# Patient Record
Sex: FEMALE | Race: WHITE | NOT HISPANIC OR LATINO
[De-identification: names, ages, dates, MRNs, and addresses within clinical notes are randomized per-mention and may not be internally consistent; named-entity substitution may affect disease eponyms.]

---

## 2017-01-03 ENCOUNTER — OTHER (OUTPATIENT)
Age: 67
End: 2017-01-03

## 2017-01-03 DIAGNOSIS — R92.2 INCONCLUSIVE MAMMOGRAM: ICD-10-CM

## 2017-03-13 ENCOUNTER — RX RENEWAL (OUTPATIENT)
Age: 67
End: 2017-03-13

## 2017-07-07 ENCOUNTER — RX RENEWAL (OUTPATIENT)
Age: 67
End: 2017-07-07

## 2017-11-22 ENCOUNTER — RX RENEWAL (OUTPATIENT)
Age: 67
End: 2017-11-22

## 2017-11-28 ENCOUNTER — APPOINTMENT (OUTPATIENT)
Dept: FAMILY MEDICINE | Facility: CLINIC | Age: 67
End: 2017-11-28
Payer: COMMERCIAL

## 2017-11-28 ENCOUNTER — NON-APPOINTMENT (OUTPATIENT)
Age: 67
End: 2017-11-28

## 2017-11-28 VITALS
RESPIRATION RATE: 14 BRPM | WEIGHT: 157 LBS | SYSTOLIC BLOOD PRESSURE: 126 MMHG | TEMPERATURE: 98 F | HEART RATE: 76 BPM | HEIGHT: 62 IN | BODY MASS INDEX: 28.89 KG/M2 | DIASTOLIC BLOOD PRESSURE: 70 MMHG | OXYGEN SATURATION: 99 %

## 2017-11-28 PROCEDURE — G0402 INITIAL PREVENTIVE EXAM: CPT

## 2017-11-28 PROCEDURE — G0438: CPT

## 2017-11-28 PROCEDURE — 93000 ELECTROCARDIOGRAM COMPLETE: CPT

## 2017-11-30 ENCOUNTER — APPOINTMENT (OUTPATIENT)
Dept: FAMILY MEDICINE | Facility: CLINIC | Age: 67
End: 2017-11-30
Payer: MEDICARE

## 2017-11-30 VITALS — SYSTOLIC BLOOD PRESSURE: 120 MMHG | TEMPERATURE: 97.6 F | DIASTOLIC BLOOD PRESSURE: 70 MMHG

## 2017-11-30 PROCEDURE — 99213 OFFICE O/P EST LOW 20 MIN: CPT

## 2017-12-05 ENCOUNTER — APPOINTMENT (OUTPATIENT)
Dept: FAMILY MEDICINE | Facility: CLINIC | Age: 67
End: 2017-12-05
Payer: MEDICARE

## 2017-12-05 VITALS — DIASTOLIC BLOOD PRESSURE: 78 MMHG | SYSTOLIC BLOOD PRESSURE: 120 MMHG | TEMPERATURE: 98.6 F

## 2017-12-05 DIAGNOSIS — L03.113 CELLULITIS OF RIGHT UPPER LIMB: ICD-10-CM

## 2017-12-05 PROCEDURE — 99213 OFFICE O/P EST LOW 20 MIN: CPT

## 2018-01-18 ENCOUNTER — APPOINTMENT (OUTPATIENT)
Dept: FAMILY MEDICINE | Facility: CLINIC | Age: 68
End: 2018-01-18
Payer: MEDICARE

## 2018-01-18 VITALS — TEMPERATURE: 97.6 F | SYSTOLIC BLOOD PRESSURE: 112 MMHG | DIASTOLIC BLOOD PRESSURE: 70 MMHG

## 2018-01-18 DIAGNOSIS — Z02.9 ENCOUNTER FOR ADMINISTRATIVE EXAMINATIONS, UNSPECIFIED: ICD-10-CM

## 2018-01-18 PROCEDURE — 99080 SPECIAL REPORTS OR FORMS: CPT

## 2018-01-18 PROCEDURE — 99213 OFFICE O/P EST LOW 20 MIN: CPT

## 2018-01-19 PROBLEM — Z02.9 ADMINISTRATIVE ENCOUNTER: Status: ACTIVE | Noted: 2018-01-19

## 2018-01-29 ENCOUNTER — APPOINTMENT (OUTPATIENT)
Dept: FAMILY MEDICINE | Facility: CLINIC | Age: 68
End: 2018-01-29
Payer: MEDICARE

## 2018-01-29 VITALS — TEMPERATURE: 97.5 F | DIASTOLIC BLOOD PRESSURE: 72 MMHG | SYSTOLIC BLOOD PRESSURE: 118 MMHG

## 2018-01-29 PROCEDURE — 90707 MMR VACCINE SC: CPT | Mod: GY

## 2018-01-29 PROCEDURE — 90471 IMMUNIZATION ADMIN: CPT | Mod: GY

## 2018-01-31 ENCOUNTER — APPOINTMENT (OUTPATIENT)
Dept: FAMILY MEDICINE | Facility: CLINIC | Age: 68
End: 2018-01-31
Payer: MEDICARE

## 2018-01-31 VITALS — SYSTOLIC BLOOD PRESSURE: 110 MMHG | DIASTOLIC BLOOD PRESSURE: 68 MMHG | TEMPERATURE: 97.8 F

## 2018-01-31 DIAGNOSIS — Z23 ENCOUNTER FOR IMMUNIZATION: ICD-10-CM

## 2018-01-31 PROCEDURE — 90471 IMMUNIZATION ADMIN: CPT | Mod: GY

## 2018-01-31 PROCEDURE — 90714 TD VACC NO PRESV 7 YRS+ IM: CPT | Mod: GY

## 2018-04-20 ENCOUNTER — APPOINTMENT (OUTPATIENT)
Dept: FAMILY MEDICINE | Facility: CLINIC | Age: 68
End: 2018-04-20
Payer: MEDICARE

## 2018-04-20 VITALS — DIASTOLIC BLOOD PRESSURE: 70 MMHG | TEMPERATURE: 98 F | SYSTOLIC BLOOD PRESSURE: 120 MMHG

## 2018-04-20 DIAGNOSIS — G51.8 OTHER DISORDERS OF FACIAL NERVE: ICD-10-CM

## 2018-04-20 PROCEDURE — 99214 OFFICE O/P EST MOD 30 MIN: CPT

## 2018-04-22 PROBLEM — G51.8 FACIAL NEURALGIA: Status: ACTIVE | Noted: 2018-04-22

## 2018-05-12 ENCOUNTER — TRANSCRIPTION ENCOUNTER (OUTPATIENT)
Age: 68
End: 2018-05-12

## 2018-06-07 ENCOUNTER — NON-APPOINTMENT (OUTPATIENT)
Age: 68
End: 2018-06-07

## 2018-06-07 ENCOUNTER — APPOINTMENT (OUTPATIENT)
Dept: FAMILY MEDICINE | Facility: CLINIC | Age: 68
End: 2018-06-07
Payer: MEDICARE

## 2018-06-07 VITALS
HEIGHT: 62 IN | TEMPERATURE: 98.1 F | BODY MASS INDEX: 28.52 KG/M2 | SYSTOLIC BLOOD PRESSURE: 158 MMHG | DIASTOLIC BLOOD PRESSURE: 90 MMHG | WEIGHT: 155 LBS | RESPIRATION RATE: 14 BRPM | HEART RATE: 73 BPM | OXYGEN SATURATION: 98 %

## 2018-06-07 DIAGNOSIS — Z01.818 ENCOUNTER FOR OTHER PREPROCEDURAL EXAMINATION: ICD-10-CM

## 2018-06-07 PROCEDURE — 93000 ELECTROCARDIOGRAM COMPLETE: CPT

## 2018-06-07 PROCEDURE — 99215 OFFICE O/P EST HI 40 MIN: CPT | Mod: 25

## 2018-06-07 PROCEDURE — 36415 COLL VENOUS BLD VENIPUNCTURE: CPT

## 2018-06-08 LAB
ALBUMIN SERPL ELPH-MCNC: 4.5 G/DL
ALP BLD-CCNC: 64 U/L
ALT SERPL-CCNC: 38 U/L
ANION GAP SERPL CALC-SCNC: 16 MMOL/L
APPEARANCE: CLEAR
APTT BLD: 28.5 SEC
AST SERPL-CCNC: 27 U/L
BACTERIA: NEGATIVE
BASOPHILS # BLD AUTO: 0.02 K/UL
BASOPHILS NFR BLD AUTO: 0.4 %
BILIRUB SERPL-MCNC: <0.2 MG/DL
BILIRUBIN URINE: NEGATIVE
BLOOD URINE: NEGATIVE
BUN SERPL-MCNC: 18 MG/DL
CALCIUM SERPL-MCNC: 9.5 MG/DL
CHLORIDE SERPL-SCNC: 101 MMOL/L
CO2 SERPL-SCNC: 24 MMOL/L
COLOR: YELLOW
CREAT SERPL-MCNC: 0.9 MG/DL
EOSINOPHIL # BLD AUTO: 0.14 K/UL
EOSINOPHIL NFR BLD AUTO: 2.6 %
GLUCOSE QUALITATIVE U: NEGATIVE MG/DL
GLUCOSE SERPL-MCNC: 83 MG/DL
HCT VFR BLD CALC: 40.1 %
HGB BLD-MCNC: 12.7 G/DL
HYALINE CASTS: 0 /LPF
IMM GRANULOCYTES NFR BLD AUTO: 0.4 %
INR PPP: 0.85 RATIO
KETONES URINE: NEGATIVE
LEUKOCYTE ESTERASE URINE: NEGATIVE
LYMPHOCYTES # BLD AUTO: 1.95 K/UL
LYMPHOCYTES NFR BLD AUTO: 36.8 %
MAN DIFF?: NORMAL
MCHC RBC-ENTMCNC: 28.9 PG
MCHC RBC-ENTMCNC: 31.7 GM/DL
MCV RBC AUTO: 91.3 FL
MICROSCOPIC-UA: NORMAL
MONOCYTES # BLD AUTO: 0.44 K/UL
MONOCYTES NFR BLD AUTO: 8.3 %
NEUTROPHILS # BLD AUTO: 2.73 K/UL
NEUTROPHILS NFR BLD AUTO: 51.5 %
NITRITE URINE: NEGATIVE
PH URINE: 5.5
PLATELET # BLD AUTO: 361 K/UL
POTASSIUM SERPL-SCNC: 4.7 MMOL/L
PROT SERPL-MCNC: 7.3 G/DL
PROTEIN URINE: NEGATIVE MG/DL
PT BLD: 9.6 SEC
RBC # BLD: 4.39 M/UL
RBC # FLD: 14.3 %
RED BLOOD CELLS URINE: 2 /HPF
SODIUM SERPL-SCNC: 141 MMOL/L
SPECIFIC GRAVITY URINE: 1.02
SQUAMOUS EPITHELIAL CELLS: 1 /HPF
UROBILINOGEN URINE: NEGATIVE MG/DL
WBC # FLD AUTO: 5.3 K/UL
WHITE BLOOD CELLS URINE: 1 /HPF

## 2018-06-14 ENCOUNTER — APPOINTMENT (OUTPATIENT)
Dept: FAMILY MEDICINE | Facility: CLINIC | Age: 68
End: 2018-06-14
Payer: MEDICARE

## 2018-06-14 VITALS — SYSTOLIC BLOOD PRESSURE: 140 MMHG | TEMPERATURE: 98.4 F | DIASTOLIC BLOOD PRESSURE: 80 MMHG

## 2018-06-14 DIAGNOSIS — J06.9 ACUTE UPPER RESPIRATORY INFECTION, UNSPECIFIED: ICD-10-CM

## 2018-06-14 PROCEDURE — 99214 OFFICE O/P EST MOD 30 MIN: CPT

## 2018-06-14 RX ORDER — DOXYCYCLINE HYCLATE 100 MG/1
100 CAPSULE ORAL
Qty: 10 | Refills: 0 | Status: DISCONTINUED | COMMUNITY
Start: 2017-11-30 | End: 2018-06-14

## 2018-08-13 ENCOUNTER — RX RENEWAL (OUTPATIENT)
Age: 68
End: 2018-08-13

## 2018-10-31 ENCOUNTER — TRANSCRIPTION ENCOUNTER (OUTPATIENT)
Age: 68
End: 2018-10-31

## 2018-11-01 ENCOUNTER — APPOINTMENT (OUTPATIENT)
Dept: FAMILY MEDICINE | Facility: CLINIC | Age: 68
End: 2018-11-01
Payer: MEDICARE

## 2018-11-01 VITALS — DIASTOLIC BLOOD PRESSURE: 80 MMHG | SYSTOLIC BLOOD PRESSURE: 130 MMHG

## 2018-11-01 DIAGNOSIS — T78.40XA ALLERGY, UNSPECIFIED, INITIAL ENCOUNTER: ICD-10-CM

## 2018-11-01 DIAGNOSIS — Z87.09 PERSONAL HISTORY OF OTHER DISEASES OF THE RESPIRATORY SYSTEM: ICD-10-CM

## 2018-11-01 PROCEDURE — 99213 OFFICE O/P EST LOW 20 MIN: CPT

## 2018-11-01 NOTE — HISTORY OF PRESENT ILLNESS
[de-identified] : Pt presents to office for cough and URI.Pt went to UC 2 days ago dx with virus and given Bromfed which she had an allergic reaction with itching all over body however no rash noted. Also c/o bruising on arms.Takes baby asa daily

## 2018-11-01 NOTE — REVIEW OF SYSTEMS
[Fatigue] : fatigue [Nasal Discharge] : nasal discharge [Sore Throat] : sore throat [Postnasal Drip] : postnasal drip [Cough] : cough [Easy Bruising] : easy bruising [Negative] : Cardiovascular

## 2018-11-01 NOTE — PHYSICAL EXAM
[No Acute Distress] : no acute distress [Normal Oropharynx] : the oropharynx was normal [Supple] : supple [Clear to Auscultation] : lungs were clear to auscultation bilaterally [Regular Rhythm] : with a regular rhythm [de-identified] : few minor bruising on wrists

## 2018-11-01 NOTE — PHYSICAL EXAM
[No Acute Distress] : no acute distress [Normal Oropharynx] : the oropharynx was normal [Supple] : supple [Clear to Auscultation] : lungs were clear to auscultation bilaterally [Regular Rhythm] : with a regular rhythm [de-identified] : few minor bruising on wrists

## 2018-11-01 NOTE — HISTORY OF PRESENT ILLNESS
[de-identified] : Pt presents to office for cough and URI.Pt went to UC 2 days ago dx with virus and given Bromfed which she had an allergic reaction with itching all over body however no rash noted. Also c/o bruising on arms.Takes baby asa daily

## 2018-11-28 ENCOUNTER — RX RENEWAL (OUTPATIENT)
Age: 68
End: 2018-11-28

## 2018-11-28 DIAGNOSIS — T75.3XXA MOTION SICKNESS, INITIAL ENCOUNTER: ICD-10-CM

## 2018-11-28 RX ORDER — SCOPALAMINE 1 MG/3D
1 PATCH, EXTENDED RELEASE TRANSDERMAL
Qty: 1 | Refills: 0 | Status: ACTIVE | COMMUNITY
Start: 2018-01-29 | End: 1900-01-01

## 2018-12-26 NOTE — ASSESSMENT
[FreeTextEntry1] : Pt presents to office for cough and URI.Pt went to UC 2 days ago dx with virus and given Bromed which she had an allergic reaction with itching all over body however no rash noted. Also c/o bruising on arms.Takes baby asa daily\par \par pt will decrease Asa to twice weekly\par Pt to start Tessalon perle\par 
[FreeTextEntry1] : Pt presents to office for cough and URI.Pt went to UC 2 days ago dx with virus and given Bromed which she had an allergic reaction with itching all over body however no rash noted. Also c/o bruising on arms.Takes baby asa daily\par \par pt will decrease Asa to twice weekly\par Pt to start Tessalon perle\par 
done

## 2019-02-11 ENCOUNTER — APPOINTMENT (OUTPATIENT)
Dept: FAMILY MEDICINE | Facility: CLINIC | Age: 69
End: 2019-02-11
Payer: MEDICARE

## 2019-02-11 VITALS
TEMPERATURE: 97.7 F | WEIGHT: 166 LBS | SYSTOLIC BLOOD PRESSURE: 132 MMHG | RESPIRATION RATE: 14 BRPM | HEIGHT: 62 IN | HEART RATE: 76 BPM | DIASTOLIC BLOOD PRESSURE: 80 MMHG | BODY MASS INDEX: 30.55 KG/M2 | OXYGEN SATURATION: 98 %

## 2019-02-11 DIAGNOSIS — M19.90 UNSPECIFIED OSTEOARTHRITIS, UNSPECIFIED SITE: ICD-10-CM

## 2019-02-11 PROCEDURE — G0438: CPT

## 2019-02-11 NOTE — HEALTH RISK ASSESSMENT
[Patient reported mammogram was normal] : Patient reported mammogram was normal [Patient reported PAP Smear was normal] : Patient reported PAP Smear was normal [MammogramDate] : 1/19 [PapSmearDate] : 4/18 [BoneDensityDate] : 1/19 [ColonoscopyDate] : 2015

## 2019-02-11 NOTE — ASSESSMENT
[FreeTextEntry1] : Pt presents to office for routine PE. Forms to be filled out for her work. Offers no complaints.Pt gained weight over the last few months. Not compliant with diet.\par Labs Normal except Calcium slightly low 8.4\par willreepat in 3 months\par Pt encouraged to watch diet and increase exercise\par Reviewed titiers for form

## 2019-02-11 NOTE — PHYSICAL EXAM

## 2019-02-11 NOTE — HISTORY OF PRESENT ILLNESS
[de-identified] : Pt presents to office for routine PE. Forms to be filled out for her work. Offers no complaints.Pt gained weight over the last few months. Not compliant with diet.

## 2019-02-13 LAB — HBA1C MFR BLD HPLC: 5.5

## 2019-03-07 ENCOUNTER — RX RENEWAL (OUTPATIENT)
Age: 69
End: 2019-03-07

## 2019-03-26 ENCOUNTER — APPOINTMENT (OUTPATIENT)
Dept: FAMILY MEDICINE | Facility: CLINIC | Age: 69
End: 2019-03-26
Payer: MEDICARE

## 2019-03-26 PROCEDURE — 36415 COLL VENOUS BLD VENIPUNCTURE: CPT

## 2019-04-02 ENCOUNTER — OUTPATIENT (OUTPATIENT)
Dept: OUTPATIENT SERVICES | Facility: HOSPITAL | Age: 69
LOS: 1 days | End: 2019-04-02
Payer: MEDICARE

## 2019-04-02 ENCOUNTER — APPOINTMENT (OUTPATIENT)
Dept: RADIOLOGY | Facility: IMAGING CENTER | Age: 69
End: 2019-04-02
Payer: MEDICARE

## 2019-04-02 DIAGNOSIS — E32.9 DISEASE OF THYMUS, UNSPECIFIED: Chronic | ICD-10-CM

## 2019-04-02 DIAGNOSIS — Z00.00 ENCOUNTER FOR GENERAL ADULT MEDICAL EXAMINATION WITHOUT ABNORMAL FINDINGS: ICD-10-CM

## 2019-04-02 DIAGNOSIS — M67.439 GANGLION, UNSPECIFIED WRIST: Chronic | ICD-10-CM

## 2019-04-02 DIAGNOSIS — Z98.89 OTHER SPECIFIED POSTPROCEDURAL STATES: Chronic | ICD-10-CM

## 2019-04-02 DIAGNOSIS — E78.5 HYPERLIPIDEMIA, UNSPECIFIED: ICD-10-CM

## 2019-04-02 PROCEDURE — 77080 DXA BONE DENSITY AXIAL: CPT

## 2019-04-02 PROCEDURE — 77080 DXA BONE DENSITY AXIAL: CPT | Mod: 26

## 2019-04-05 LAB
MEV IGG FLD QL IA: >300 AU/ML
MEV IGG+IGM SER-IMP: POSITIVE
MUV AB SER-ACNC: POSITIVE
MUV IGG SER QL IA: 257 AU/ML
RUBV IGG FLD-ACNC: >33 INDEX
RUBV IGG SER-IMP: POSITIVE

## 2019-04-29 ENCOUNTER — RX RENEWAL (OUTPATIENT)
Age: 69
End: 2019-04-29

## 2019-05-07 ENCOUNTER — APPOINTMENT (OUTPATIENT)
Dept: GASTROENTEROLOGY | Facility: CLINIC | Age: 69
End: 2019-05-07
Payer: MEDICARE

## 2019-05-07 VITALS
DIASTOLIC BLOOD PRESSURE: 75 MMHG | SYSTOLIC BLOOD PRESSURE: 126 MMHG | BODY MASS INDEX: 30.55 KG/M2 | HEIGHT: 62 IN | WEIGHT: 166 LBS | HEART RATE: 77 BPM

## 2019-05-07 DIAGNOSIS — Z87.19 PERSONAL HISTORY OF OTHER DISEASES OF THE DIGESTIVE SYSTEM: ICD-10-CM

## 2019-05-07 DIAGNOSIS — Z87.898 PERSONAL HISTORY OF OTHER SPECIFIED CONDITIONS: ICD-10-CM

## 2019-05-07 PROCEDURE — 82274 ASSAY TEST FOR BLOOD FECAL: CPT | Mod: QW

## 2019-05-07 PROCEDURE — 99204 OFFICE O/P NEW MOD 45 MIN: CPT

## 2019-05-07 RX ORDER — AZITHROMYCIN 250 MG/1
250 TABLET, FILM COATED ORAL
Qty: 6 | Refills: 0 | Status: DISCONTINUED | COMMUNITY
Start: 2018-06-14 | End: 2019-05-07

## 2019-05-07 RX ORDER — AMOXICILLIN 500 MG/1
500 TABLET, FILM COATED ORAL 3 TIMES DAILY
Qty: 21 | Refills: 0 | Status: DISCONTINUED | COMMUNITY
Start: 2018-12-20 | End: 2019-05-07

## 2019-05-10 NOTE — HISTORY OF PRESENT ILLNESS
[FreeTextEntry1] : Hyperplastic polyps and hemorrhoids were noted at last colonoscopy February 2014. She had been gassy, but since starting Align daily, that has abated. Two cousins had colon cancer.

## 2019-05-10 NOTE — CONSULT LETTER
[Dear  ___] : Dear  [unfilled], [Consult Letter:] : I had the pleasure of evaluating your patient, [unfilled]. [Consult Closing:] : Thank you very much for allowing me to participate in the care of this patient.  If you have any questions, please do not hesitate to contact me. [Sincerely,] : Sincerely, [Please see my note below.] : Please see my note below. [FreeTextEntry3] : Patrick Sethi M.D.\par

## 2019-05-10 NOTE — ASSESSMENT
[FreeTextEntry1] : 1. Polyps, hemorrhoids at repeat colonoscopy February 2014; family history of colon cancer (two cousins)--rule out metachronous colorectal neoplasia.\par 2. History of gassiness, resolved with probiotics.\par 3. Obesity.\par 4. Hard of hearing.\par 5. Heart murmur.\par 6. Hyperlipidemia.\par 7. History of psoriasis.\par 8. Osteoarthritis, DJD of spine; osteopenia.\par 9. Ex-smoker.\par 10. Status post bladder suspension surgery, appendectomy, right inguinal herniorrhaphy, thymectomy, shoulder arthroscopy, left wrist ganglion cyst excision.\par 11. Allergic to narcotic analgesics.\par \par Plan:\par 1. Medical records, including latest labs, reviewed.\par 2. Agree with surveillance colonoscopy-- Procedure, rationale, anesthesia plan, MiraLax prep instructions were again reviewed and brochure given.\par

## 2019-05-10 NOTE — PHYSICAL EXAM
[General Appearance - Alert] : alert [General Appearance - Well Nourished] : well nourished [General Appearance - In No Acute Distress] : in no acute distress [General Appearance - Well Developed] : well developed [Outer Ear] : the ears and nose were normal in appearance [Sclera] : the sclera and conjunctiva were normal [Neck Appearance] : the appearance of the neck was normal [Oropharynx] : the oropharynx was normal [Neck Cervical Mass (___cm)] : no neck mass was observed [Jugular Venous Distention Increased] : there was no jugular-venous distention [Thyroid Diffuse Enlargement] : the thyroid was not enlarged [Thyroid Nodule] : there were no palpable thyroid nodules [Auscultation Breath Sounds / Voice Sounds] : lungs were clear to auscultation bilaterally [Heart Rate And Rhythm] : heart rate was normal and rhythm regular [Heart Sounds] : normal S1 and S2 [Heart Sounds Gallop] : no gallops [Heart Sounds Pericardial Friction Rub] : no pericardial rub [Full Pulse] : the pedal pulses are present [Edema] : there was no peripheral edema [Bowel Sounds] : normal bowel sounds [Abdomen Soft] : soft [Abdomen Tenderness] : non-tender [Abdomen Mass (___ Cm)] : no abdominal mass palpated [Abdomen Hernia] : no hernia was discovered [Normal Sphincter Tone] : normal sphincter tone [Internal Hemorrhoid] : internal hemorrhoids [No Rectal Mass] : no rectal mass [Cervical Lymph Nodes Enlarged Posterior Bilaterally] : posterior cervical [Axillary Lymph Nodes Enlarged Bilaterally] : axillary [Cervical Lymph Nodes Enlarged Anterior Bilaterally] : anterior cervical [Supraclavicular Lymph Nodes Enlarged Bilaterally] : supraclavicular [No CVA Tenderness] : no ~M costovertebral angle tenderness [Inguinal Lymph Nodes Enlarged Bilaterally] : inguinal [Femoral Lymph Nodes Enlarged Bilaterally] : femoral [Nail Clubbing] : no clubbing  or cyanosis of the fingernails [Abnormal Walk] : normal gait [No Spinal Tenderness] : no spinal tenderness [Motor Tone] : muscle strength and tone were normal [Musculoskeletal - Swelling] : no joint swelling seen [] : no rash [Skin Color & Pigmentation] : normal skin color and pigmentation [Skin Turgor] : normal skin turgor [Impaired Insight] : insight and judgment were intact [Affect] : the affect was normal [Oriented To Time, Place, And Person] : oriented to person, place, and time [External Hemorrhoid] : no external hemorrhoids [Occult Blood Positive] : stool was negative for occult blood [FreeTextEntry1] : multiple cherry angiomata

## 2019-06-03 ENCOUNTER — APPOINTMENT (OUTPATIENT)
Dept: GASTROENTEROLOGY | Facility: CLINIC | Age: 69
End: 2019-06-03
Payer: MEDICARE

## 2019-06-03 ENCOUNTER — LABORATORY RESULT (OUTPATIENT)
Age: 69
End: 2019-06-03

## 2019-06-03 PROCEDURE — 45380 COLONOSCOPY AND BIOPSY: CPT

## 2019-07-06 ENCOUNTER — RX RENEWAL (OUTPATIENT)
Age: 69
End: 2019-07-06

## 2019-09-03 ENCOUNTER — RX RENEWAL (OUTPATIENT)
Age: 69
End: 2019-09-03

## 2019-09-27 ENCOUNTER — TRANSCRIPTION ENCOUNTER (OUTPATIENT)
Age: 69
End: 2019-09-27

## 2020-01-09 ENCOUNTER — OUTPATIENT (OUTPATIENT)
Dept: OUTPATIENT SERVICES | Facility: HOSPITAL | Age: 70
LOS: 1 days | End: 2020-01-09
Payer: MEDICARE

## 2020-01-09 ENCOUNTER — APPOINTMENT (OUTPATIENT)
Dept: RADIOLOGY | Facility: CLINIC | Age: 70
End: 2020-01-09
Payer: MEDICARE

## 2020-01-09 DIAGNOSIS — Z98.89 OTHER SPECIFIED POSTPROCEDURAL STATES: Chronic | ICD-10-CM

## 2020-01-09 DIAGNOSIS — M67.439 GANGLION, UNSPECIFIED WRIST: Chronic | ICD-10-CM

## 2020-01-09 DIAGNOSIS — Z00.8 ENCOUNTER FOR OTHER GENERAL EXAMINATION: ICD-10-CM

## 2020-01-09 DIAGNOSIS — E32.9 DISEASE OF THYMUS, UNSPECIFIED: Chronic | ICD-10-CM

## 2020-01-09 PROCEDURE — 71046 X-RAY EXAM CHEST 2 VIEWS: CPT

## 2020-01-09 PROCEDURE — 71046 X-RAY EXAM CHEST 2 VIEWS: CPT | Mod: 26

## 2020-02-12 ENCOUNTER — APPOINTMENT (OUTPATIENT)
Dept: FAMILY MEDICINE | Facility: CLINIC | Age: 70
End: 2020-02-12
Payer: MEDICARE

## 2020-02-12 VITALS
WEIGHT: 171.4 LBS | RESPIRATION RATE: 16 BRPM | TEMPERATURE: 98 F | SYSTOLIC BLOOD PRESSURE: 138 MMHG | HEIGHT: 62 IN | OXYGEN SATURATION: 97 % | HEART RATE: 84 BPM | DIASTOLIC BLOOD PRESSURE: 80 MMHG | BODY MASS INDEX: 31.54 KG/M2

## 2020-02-12 DIAGNOSIS — E03.9 HYPOTHYROIDISM, UNSPECIFIED: ICD-10-CM

## 2020-02-12 PROCEDURE — G0439: CPT

## 2020-03-03 ENCOUNTER — APPOINTMENT (OUTPATIENT)
Dept: FAMILY MEDICINE | Facility: CLINIC | Age: 70
End: 2020-03-03
Payer: MEDICARE

## 2020-03-03 VITALS
TEMPERATURE: 97.7 F | RESPIRATION RATE: 14 BRPM | BODY MASS INDEX: 31.1 KG/M2 | WEIGHT: 169 LBS | HEART RATE: 82 BPM | OXYGEN SATURATION: 97 % | HEIGHT: 62 IN | SYSTOLIC BLOOD PRESSURE: 118 MMHG | DIASTOLIC BLOOD PRESSURE: 80 MMHG

## 2020-03-03 PROCEDURE — 99214 OFFICE O/P EST MOD 30 MIN: CPT

## 2020-03-03 NOTE — PLAN
[FreeTextEntry1] : Pre-op\par - Pt is optimized for surgery, no additional testing needed.\par - d/c all vitamins, fish oils, Aspirin, Advil, Motrin until surgery, Restart 72hrs s/p surgery.\par - Pt provided pre-op clearance form, to be scanned and sent to the surgeon.

## 2020-03-03 NOTE — REVIEW OF SYSTEMS
[Negative] : Integumentary [Fever] : no fever [Chills] : no chills [Chest Pain] : no chest pain [Shortness Of Breath] : no shortness of breath [Nausea] : no nausea [Diarrhea] : diarrhea [Vomiting] : no vomiting [Headache] : no headache

## 2020-03-03 NOTE — PHYSICAL EXAM
[Normal] : normal gait, coordination grossly intact, no focal deficits [de-identified] : hearing aid L ear [de-identified] : 1/6 systolic murmur [de-identified] : Warm, dry, intact. No rashes.  [de-identified] : no cervical lymphadenopathy  [de-identified] : AA&Ox3

## 2020-03-03 NOTE — HISTORY OF PRESENT ILLNESS
[No Pertinent Cardiac History] : no history of aortic stenosis, atrial fibrillation, coronary artery disease, recent myocardial infarction, or implantable device/pacemaker [No Pertinent Pulmonary History] : no history of asthma, COPD, sleep apnea, or smoking [No Adverse Anesthesia Reaction] : no adverse anesthesia reaction in self or family member [(Patient denies any chest pain, claudication, dyspnea on exertion, orthopnea, palpitations or syncope)] : Patient denies any chest pain, claudication, dyspnea on exertion, orthopnea, palpitations or syncope [Aortic Stenosis] : no aortic stenosis [Atrial Fibrillation] : no atrial fibrillation [Coronary Artery Disease] : no coronary artery disease [Recent Myocardial Infarction] : no recent myocardial infarction [Implantable Device/Pacemaker] : no implantable device/pacemaker [Asthma] : no asthma [COPD] : no COPD [Sleep Apnea] : no sleep apnea [Smoker] : not a smoker [Family Member] : no family member with adverse anesthesia reaction/sudden death [Self] : no previous adverse anesthesia reaction [Chronic Anticoagulation] : no chronic anticoagulation [Chronic Kidney Disease] : no chronic kidney disease [Diabetes] : no diabetes [FreeTextEntry1] : D&C [FreeTextEntry2] : 3/10/20 [FreeTextEntry3] : Dr. Aren Watson [FreeTextEntry4] : 71y/o F with PMHx of HLD (Lipitor) and GERD (Pantoprazole) presents to the office for pre-op medical clearance for D&C. Pt states she will be having D&C for thickening of endometrium. Denies bleeding or abdominal pain. BP in office is 118/80. Denies HA, CP, SOB, N/V/D, fever, or chills. Denies changes in bowel or bladder function. Denies recent illnesses or hospitalizations. Pt provided pre-op clearance form.  [FreeTextEntry5] : PMHx of HLD and GERD

## 2020-03-03 NOTE — ASSESSMENT
[Patient Optimized for Surgery] : Patient optimized for surgery [No Further Testing Recommended] : no further testing recommended [FreeTextEntry4] : cleared for surgery

## 2020-03-03 NOTE — ADDENDUM
[FreeTextEntry1] : I, Mila Mckeonin, acted solely as a scribe for Dr. Foy on this date 03/03/2020.\par \par All medical record entries made by the Scribe were at my, Dr. Foy, direction and personally dictated by me on 03/03/2020. I have reviewed the chart and agree that the record accurately reflects my personal performance of the history, physical exam, assessment and plan.  I have also personally directed, reviewed and agreed with the chart.

## 2020-05-19 ENCOUNTER — APPOINTMENT (OUTPATIENT)
Dept: FAMILY MEDICINE | Facility: CLINIC | Age: 70
End: 2020-05-19
Payer: MEDICARE

## 2020-05-19 PROCEDURE — 99442: CPT | Mod: 95

## 2020-06-11 ENCOUNTER — RX RENEWAL (OUTPATIENT)
Age: 70
End: 2020-06-11

## 2020-06-19 ENCOUNTER — APPOINTMENT (OUTPATIENT)
Dept: FAMILY MEDICINE | Facility: CLINIC | Age: 70
End: 2020-06-19
Payer: MEDICARE

## 2020-06-19 DIAGNOSIS — R21 RASH AND OTHER NONSPECIFIC SKIN ERUPTION: ICD-10-CM

## 2020-06-19 PROCEDURE — 99213 OFFICE O/P EST LOW 20 MIN: CPT

## 2020-06-19 NOTE — PHYSICAL EXAM
[Normal] : affect was normal and insight and judgment were intact [de-identified] : erythematous macular lacy rash on toes b/l feet

## 2020-06-19 NOTE — HISTORY OF PRESENT ILLNESS
[FreeTextEntry8] : 71 y/o F Presents to office with a 5 day history of rash on her toes. Patient denies any itching or swelling. Rash comes and goes. Denies any new detergents ,soaps or insect bites or travel.

## 2020-09-04 ENCOUNTER — APPOINTMENT (OUTPATIENT)
Dept: FAMILY MEDICINE | Facility: CLINIC | Age: 70
End: 2020-09-04
Payer: MEDICARE

## 2020-09-04 VITALS — SYSTOLIC BLOOD PRESSURE: 122 MMHG | TEMPERATURE: 98.3 F | DIASTOLIC BLOOD PRESSURE: 76 MMHG

## 2020-09-04 PROCEDURE — 99214 OFFICE O/P EST MOD 30 MIN: CPT

## 2020-09-04 NOTE — PHYSICAL EXAM
[Normal Anterior Cervical Nodes] : no anterior cervical lymphadenopathy [Normal] : no joint swelling and grossly normal strength and tone

## 2020-09-04 NOTE — HISTORY OF PRESENT ILLNESS
[FreeTextEntry8] : 71 y/o F presents to office for 2 week cough.Dry cough and spasmatic all day. No fever.Taking OTC meds and Codeine cough meds at night.Denies SOB Pt continues to have left knee pain. Saw Orthopedist. Was told "Bone on bone " needs partial knee replacement

## 2020-09-04 NOTE — REVIEW OF SYSTEMS
[Postnasal Drip] : postnasal drip [Cough] : cough [Negative] : Heme/Lymph [FreeTextEntry9] : leftt knee pain

## 2020-09-10 ENCOUNTER — RX RENEWAL (OUTPATIENT)
Age: 70
End: 2020-09-10

## 2020-10-19 ENCOUNTER — NON-APPOINTMENT (OUTPATIENT)
Age: 70
End: 2020-10-19

## 2020-12-16 PROBLEM — J06.9 URI, ACUTE: Status: RESOLVED | Noted: 2018-06-14 | Resolved: 2020-12-16

## 2021-01-11 ENCOUNTER — APPOINTMENT (OUTPATIENT)
Dept: RADIOLOGY | Facility: IMAGING CENTER | Age: 71
End: 2021-01-11
Payer: MEDICARE

## 2021-01-11 ENCOUNTER — OUTPATIENT (OUTPATIENT)
Dept: OUTPATIENT SERVICES | Facility: HOSPITAL | Age: 71
LOS: 1 days | End: 2021-01-11
Payer: MEDICARE

## 2021-01-11 DIAGNOSIS — Z98.89 OTHER SPECIFIED POSTPROCEDURAL STATES: Chronic | ICD-10-CM

## 2021-01-11 DIAGNOSIS — E32.9 DISEASE OF THYMUS, UNSPECIFIED: Chronic | ICD-10-CM

## 2021-01-11 DIAGNOSIS — M67.439 GANGLION, UNSPECIFIED WRIST: Chronic | ICD-10-CM

## 2021-01-11 DIAGNOSIS — Z00.00 ENCOUNTER FOR GENERAL ADULT MEDICAL EXAMINATION WITHOUT ABNORMAL FINDINGS: ICD-10-CM

## 2021-01-11 PROCEDURE — 71046 X-RAY EXAM CHEST 2 VIEWS: CPT

## 2021-01-11 PROCEDURE — 71046 X-RAY EXAM CHEST 2 VIEWS: CPT | Mod: 26

## 2021-02-02 ENCOUNTER — NON-APPOINTMENT (OUTPATIENT)
Age: 71
End: 2021-02-02

## 2021-02-16 ENCOUNTER — APPOINTMENT (OUTPATIENT)
Dept: FAMILY MEDICINE | Facility: CLINIC | Age: 71
End: 2021-02-16
Payer: MEDICARE

## 2021-02-16 VITALS
BODY MASS INDEX: 30.36 KG/M2 | SYSTOLIC BLOOD PRESSURE: 114 MMHG | WEIGHT: 165 LBS | HEIGHT: 62 IN | OXYGEN SATURATION: 97 % | TEMPERATURE: 98.1 F | HEART RATE: 93 BPM | DIASTOLIC BLOOD PRESSURE: 70 MMHG | RESPIRATION RATE: 14 BRPM

## 2021-02-16 PROCEDURE — G0439: CPT

## 2021-02-16 NOTE — PHYSICAL EXAM
[Normal] : normal gait, coordination grossly intact, no focal deficits [de-identified] : 2/6 systolic murmur  [de-identified] : no cervical lymphadenopathy  [de-identified] : Warm, dry, intact. No rashes.  [de-identified] : AA&Ox3

## 2021-02-16 NOTE — REVIEW OF SYSTEMS
[Negative] : Neurological [Fever] : no fever [Chills] : no chills [Fatigue] : no fatigue [Chest Pain] : no chest pain [Shortness Of Breath] : no shortness of breath [Nausea] : no nausea [Diarrhea] : diarrhea [Vomiting] : no vomiting [Headache] : no headache [FreeTextEntry9] : left knee pain

## 2021-02-16 NOTE — HISTORY OF PRESENT ILLNESS
[de-identified] : 71y/o F with PMHx of HLD and GERD presents to the office for routine Medicare Annual Wellness Exam. Pt c/o left knee pain. Went to knee specialist where she had PRP injection 6 weeks ago. Pt had CXR 1 month ago, negative. Pt sees cardiology yearly for unknown cardiac issue 5 years ago. Pt presents with 5lb weight gain in the past year. All routine labs reviewed with patient and WNL except for mildly elevated triglycerides and elevated TSH. Denies fatigue. BP in office is 138/80. Bglu is 97, HbA1c is 5.7. Last colonoscopy in 2019. Last Mammo in 2020. Last eye exam 2 weeks ago. +FHx of colon CA. Allergy to codeine, darvocet, and oxycodone. Denies HA, CP, SOB, N/V/D, fever, or chills. Denies changes in bowel or bladder function.

## 2021-02-16 NOTE — PLAN
[FreeTextEntry1] : Health maintenance\par - Encouraged exercise, weight loss, and healthy diet\par - Continue Vit D3 2000 IU daily\par - Flu vaccine UTD 10/2019\par \par Elevated TSH\par - Referral to repeat TSH in 4 months \par - F/u in 4 months

## 2021-02-16 NOTE — ADDENDUM
[FreeTextEntry1] : I, Mila Mckeonin, acted solely as a scribe for Dr. Foy on this date 02/12/2020.\par \par All medical record entries made by the Scribe were at my, Dr. Foy, direction and personally dictated by me on 02/12/2020. I have reviewed the chart and agree that the record accurately reflects my personal performance of the history, physical exam, assessment and plan.  I have also personally directed, reviewed and agreed with the chart.

## 2021-03-08 ENCOUNTER — APPOINTMENT (OUTPATIENT)
Dept: FAMILY MEDICINE | Facility: CLINIC | Age: 71
End: 2021-03-08
Payer: MEDICARE

## 2021-03-08 VITALS — SYSTOLIC BLOOD PRESSURE: 128 MMHG | TEMPERATURE: 98.6 F | DIASTOLIC BLOOD PRESSURE: 70 MMHG

## 2021-03-08 DIAGNOSIS — Z86.69 PERSONAL HISTORY OF OTHER DISEASES OF THE NERVOUS SYSTEM AND SENSE ORGANS: ICD-10-CM

## 2021-03-08 DIAGNOSIS — H92.09 OTALGIA, UNSPECIFIED EAR: ICD-10-CM

## 2021-03-08 PROCEDURE — 99212 OFFICE O/P EST SF 10 MIN: CPT

## 2021-03-08 NOTE — PHYSICAL EXAM
[Normal] : no acute distress, well nourished, well developed and well-appearing [Normal Outer Ear/Nose] : the outer ears and nose were normal in appearance [Normal Oropharynx] : the oropharynx was normal [Normal TMs] : both tympanic membranes were normal [Normal Nasal Mucosa] : the nasal mucosa was normal

## 2021-03-08 NOTE — HISTORY OF PRESENT ILLNESS
[FreeTextEntry8] : 72y/o F with PMHx of HLD (Atorvastatin) and GERD presents to the office c/o left ear pain for 3 days. Denies any further hearing loss (wears hearing aide) No oozing or discharge

## 2021-04-10 ENCOUNTER — TRANSCRIPTION ENCOUNTER (OUTPATIENT)
Age: 71
End: 2021-04-10

## 2021-05-18 ENCOUNTER — RX RENEWAL (OUTPATIENT)
Age: 71
End: 2021-05-18

## 2021-05-28 ENCOUNTER — NON-APPOINTMENT (OUTPATIENT)
Age: 71
End: 2021-05-28

## 2021-06-02 ENCOUNTER — RX RENEWAL (OUTPATIENT)
Age: 71
End: 2021-06-02

## 2021-07-13 ENCOUNTER — APPOINTMENT (OUTPATIENT)
Dept: FAMILY MEDICINE | Facility: CLINIC | Age: 71
End: 2021-07-13
Payer: MEDICARE

## 2021-07-13 VITALS — TEMPERATURE: 98 F | SYSTOLIC BLOOD PRESSURE: 122 MMHG | DIASTOLIC BLOOD PRESSURE: 80 MMHG

## 2021-07-13 DIAGNOSIS — D64.9 ANEMIA, UNSPECIFIED: ICD-10-CM

## 2021-07-13 PROCEDURE — 85018 HEMOGLOBIN: CPT | Mod: QW

## 2021-07-13 PROCEDURE — 99214 OFFICE O/P EST MOD 30 MIN: CPT | Mod: 25

## 2021-07-13 NOTE — HISTORY OF PRESENT ILLNESS
[FreeTextEntry8] : 72y/o F with PMHx of HLD (Atorvastatin) and GERD presents to the office requestingmed refill and to discuss her recent diagnosis of anemia.pt donates blood for years and this year she was told her blood count was too low to donate. Level according topatient was 10.4, Pt denies dark black stools or blood in urine.Last colonoscopy was 3 years ago NEG. Denies fatigue. Started takingiron 2 days ago and has been eating food rich in iron .Pt admits to taking Diclofenac daily for 1 month for knee pain after surgery

## 2021-08-05 LAB — HEMOGLOBIN: 11.5

## 2021-09-02 ENCOUNTER — NON-APPOINTMENT (OUTPATIENT)
Age: 71
End: 2021-09-02

## 2021-09-28 ENCOUNTER — TRANSCRIPTION ENCOUNTER (OUTPATIENT)
Age: 71
End: 2021-09-28

## 2021-10-11 ENCOUNTER — APPOINTMENT (OUTPATIENT)
Dept: FAMILY MEDICINE | Facility: CLINIC | Age: 71
End: 2021-10-11
Payer: MEDICARE

## 2021-10-11 VITALS — SYSTOLIC BLOOD PRESSURE: 126 MMHG | TEMPERATURE: 95.3 F | DIASTOLIC BLOOD PRESSURE: 82 MMHG

## 2021-10-11 DIAGNOSIS — M25.473 EFFUSION, UNSPECIFIED ANKLE: ICD-10-CM

## 2021-10-11 DIAGNOSIS — H93.8X2 OTHER SPECIFIED DISORDERS OF LEFT EAR: ICD-10-CM

## 2021-10-11 PROCEDURE — 99213 OFFICE O/P EST LOW 20 MIN: CPT

## 2021-10-11 NOTE — PHYSICAL EXAM
[Normal] : affect was normal and insight and judgment were intact [de-identified] : minimal left ankle edema non tender + Varicosities B/L ankles

## 2021-10-11 NOTE — HISTORY OF PRESENT ILLNESS
[FreeTextEntry8] : 72y/o F with PMHx of HLD (Atorvastatin) and GERD presents to office c/o popping in left ear. Pt will be flying in few days . Also has some  mild swelling in l eft ankle. No pain.

## 2021-11-22 ENCOUNTER — TRANSCRIPTION ENCOUNTER (OUTPATIENT)
Age: 71
End: 2021-11-22

## 2021-12-06 ENCOUNTER — APPOINTMENT (OUTPATIENT)
Dept: FAMILY MEDICINE | Facility: CLINIC | Age: 71
End: 2021-12-06
Payer: MEDICARE

## 2021-12-06 VITALS — SYSTOLIC BLOOD PRESSURE: 120 MMHG | DIASTOLIC BLOOD PRESSURE: 70 MMHG | TEMPERATURE: 96.8 F

## 2021-12-06 PROCEDURE — 99213 OFFICE O/P EST LOW 20 MIN: CPT

## 2021-12-06 NOTE — REVIEW OF SYSTEMS
[Fatigue] : fatigue [Nasal Discharge] : nasal discharge [Postnasal Drip] : postnasal drip [Cough] : cough [Negative] : Heme/Lymph

## 2021-12-06 NOTE — HISTORY OF PRESENT ILLNESS
[de-identified] : 72y/o F with PMHx of HLD (Atorvastatin) and GERD presents to office for sinusitis. pt went to UC 3 days ago and was diagnosed with Sinusitis given Augmentin 875 BID and not feeling better. Still has thick mucus in throat and now annoying cough. Denies fever. Covid test NEG.Took Dayqil and Nightquil

## 2021-12-29 ENCOUNTER — NON-APPOINTMENT (OUTPATIENT)
Age: 71
End: 2021-12-29

## 2021-12-30 ENCOUNTER — APPOINTMENT (OUTPATIENT)
Dept: FAMILY MEDICINE | Facility: CLINIC | Age: 71
End: 2021-12-30
Payer: MEDICARE

## 2021-12-30 PROCEDURE — 99443: CPT | Mod: 95

## 2022-01-04 ENCOUNTER — APPOINTMENT (OUTPATIENT)
Dept: FAMILY MEDICINE | Facility: CLINIC | Age: 72
End: 2022-01-04
Payer: MEDICARE

## 2022-01-04 PROCEDURE — 99443: CPT | Mod: 95

## 2022-01-19 ENCOUNTER — APPOINTMENT (OUTPATIENT)
Dept: RADIOLOGY | Facility: CLINIC | Age: 72
End: 2022-01-19
Payer: MEDICARE

## 2022-01-19 ENCOUNTER — OUTPATIENT (OUTPATIENT)
Dept: OUTPATIENT SERVICES | Facility: HOSPITAL | Age: 72
LOS: 1 days | End: 2022-01-19
Payer: MEDICARE

## 2022-01-19 DIAGNOSIS — E32.9 DISEASE OF THYMUS, UNSPECIFIED: Chronic | ICD-10-CM

## 2022-01-19 DIAGNOSIS — M67.439 GANGLION, UNSPECIFIED WRIST: Chronic | ICD-10-CM

## 2022-01-19 DIAGNOSIS — Z00.8 ENCOUNTER FOR OTHER GENERAL EXAMINATION: ICD-10-CM

## 2022-01-19 DIAGNOSIS — Z98.89 OTHER SPECIFIED POSTPROCEDURAL STATES: Chronic | ICD-10-CM

## 2022-01-19 PROCEDURE — 71046 X-RAY EXAM CHEST 2 VIEWS: CPT

## 2022-01-19 PROCEDURE — 71046 X-RAY EXAM CHEST 2 VIEWS: CPT | Mod: 26

## 2022-01-20 ENCOUNTER — APPOINTMENT (OUTPATIENT)
Dept: FAMILY MEDICINE | Facility: CLINIC | Age: 72
End: 2022-01-20
Payer: MEDICARE

## 2022-01-20 PROCEDURE — 99443: CPT | Mod: 95

## 2022-01-23 LAB — SARS-COV-2 N GENE NPH QL NAA+PROBE: NOT DETECTED

## 2022-01-24 ENCOUNTER — NON-APPOINTMENT (OUTPATIENT)
Age: 72
End: 2022-01-24

## 2022-01-26 ENCOUNTER — APPOINTMENT (OUTPATIENT)
Dept: FAMILY MEDICINE | Facility: CLINIC | Age: 72
End: 2022-01-26
Payer: MEDICARE

## 2022-01-26 DIAGNOSIS — Z11.1 ENCOUNTER FOR SCREENING FOR RESPIRATORY TUBERCULOSIS: ICD-10-CM

## 2022-01-26 PROCEDURE — 86580 TB INTRADERMAL TEST: CPT

## 2022-01-28 ENCOUNTER — NON-APPOINTMENT (OUTPATIENT)
Age: 72
End: 2022-01-28

## 2022-02-14 LAB
25(OH)D3 SERPL-MCNC: 48.9 NG/ML
ALBUMIN SERPL ELPH-MCNC: 4.7 G/DL
ALP BLD-CCNC: 82 U/L
ALT SERPL-CCNC: 36 U/L
ANION GAP SERPL CALC-SCNC: 14 MMOL/L
APPEARANCE: CLEAR
AST SERPL-CCNC: 30 U/L
BACTERIA: NEGATIVE
BASOPHILS # BLD AUTO: 0.06 K/UL
BASOPHILS NFR BLD AUTO: 1.2 %
BILIRUB SERPL-MCNC: 0.4 MG/DL
BILIRUBIN URINE: NEGATIVE
BLOOD URINE: NEGATIVE
BUN SERPL-MCNC: 17 MG/DL
CALCIUM SERPL-MCNC: 9.7 MG/DL
CHLORIDE SERPL-SCNC: 103 MMOL/L
CHOLEST SERPL-MCNC: 193 MG/DL
CO2 SERPL-SCNC: 25 MMOL/L
COLOR: YELLOW
CREAT SERPL-MCNC: 0.96 MG/DL
EOSINOPHIL # BLD AUTO: 0.24 K/UL
EOSINOPHIL NFR BLD AUTO: 5 %
ESTIMATED AVERAGE GLUCOSE: 117 MG/DL
FOLATE SERPL-MCNC: >20 NG/ML
GLUCOSE QUALITATIVE U: NEGATIVE
GLUCOSE SERPL-MCNC: 101 MG/DL
HBA1C MFR BLD HPLC: 5.7 %
HCT VFR BLD CALC: 40.5 %
HDLC SERPL-MCNC: 54 MG/DL
HGB BLD-MCNC: 12.2 G/DL
HYALINE CASTS: 3 /LPF
IMM GRANULOCYTES NFR BLD AUTO: 0.4 %
KETONES URINE: NEGATIVE
LDLC SERPL CALC-MCNC: 110 MG/DL
LEUKOCYTE ESTERASE URINE: NEGATIVE
LYMPHOCYTES # BLD AUTO: 1.45 K/UL
LYMPHOCYTES NFR BLD AUTO: 30.1 %
MAN DIFF?: NORMAL
MCHC RBC-ENTMCNC: 28.2 PG
MCHC RBC-ENTMCNC: 30.1 GM/DL
MCV RBC AUTO: 93.5 FL
MICROSCOPIC-UA: NORMAL
MONOCYTES # BLD AUTO: 0.44 K/UL
MONOCYTES NFR BLD AUTO: 9.1 %
NEUTROPHILS # BLD AUTO: 2.61 K/UL
NEUTROPHILS NFR BLD AUTO: 54.2 %
NITRITE URINE: NEGATIVE
NONHDLC SERPL-MCNC: 140 MG/DL
PH URINE: 7
PLATELET # BLD AUTO: 354 K/UL
POTASSIUM SERPL-SCNC: 4.4 MMOL/L
PROT SERPL-MCNC: 7.1 G/DL
PROTEIN URINE: NORMAL
RBC # BLD: 4.33 M/UL
RBC # FLD: 13.7 %
RED BLOOD CELLS URINE: 2 /HPF
SODIUM SERPL-SCNC: 142 MMOL/L
SPECIFIC GRAVITY URINE: 1.02
SQUAMOUS EPITHELIAL CELLS: 7 /HPF
TRIGL SERPL-MCNC: 147 MG/DL
TSH SERPL-ACNC: 3.42 UIU/ML
UROBILINOGEN URINE: NORMAL
VIT B12 SERPL-MCNC: 1279 PG/ML
WBC # FLD AUTO: 4.82 K/UL
WHITE BLOOD CELLS URINE: 2 /HPF

## 2022-02-17 ENCOUNTER — APPOINTMENT (OUTPATIENT)
Dept: FAMILY MEDICINE | Facility: CLINIC | Age: 72
End: 2022-02-17
Payer: MEDICARE

## 2022-02-17 ENCOUNTER — NON-APPOINTMENT (OUTPATIENT)
Age: 72
End: 2022-02-17

## 2022-02-17 VITALS
HEART RATE: 93 BPM | RESPIRATION RATE: 14 BRPM | TEMPERATURE: 97.7 F | BODY MASS INDEX: 31.1 KG/M2 | OXYGEN SATURATION: 98 % | WEIGHT: 169 LBS | DIASTOLIC BLOOD PRESSURE: 70 MMHG | HEIGHT: 62 IN | SYSTOLIC BLOOD PRESSURE: 120 MMHG

## 2022-02-17 DIAGNOSIS — Z92.29 PERSONAL HISTORY OF OTHER DRUG THERAPY: ICD-10-CM

## 2022-02-17 DIAGNOSIS — E66.3 OVERWEIGHT: ICD-10-CM

## 2022-02-17 PROCEDURE — G0439: CPT

## 2022-02-17 PROCEDURE — G0447 BEHAVIOR COUNSEL OBESITY 15M: CPT | Mod: 59

## 2022-02-17 PROCEDURE — 93000 ELECTROCARDIOGRAM COMPLETE: CPT

## 2022-02-17 NOTE — PHYSICAL EXAM
[No Acute Distress] : no acute distress [Well Nourished] : well nourished [Well Developed] : well developed [Well-Appearing] : well-appearing [Normal Sclera/Conjunctiva] : normal sclera/conjunctiva [PERRL] : pupils equal round and reactive to light [EOMI] : extraocular movements intact [Normal Outer Ear/Nose] : the outer ears and nose were normal in appearance [Normal Oropharynx] : the oropharynx was normal [No JVD] : no jugular venous distention [No Lymphadenopathy] : no lymphadenopathy [Supple] : supple [Thyroid Normal, No Nodules] : the thyroid was normal and there were no nodules present [No Respiratory Distress] : no respiratory distress  [No Accessory Muscle Use] : no accessory muscle use [Clear to Auscultation] : lungs were clear to auscultation bilaterally [Normal Rate] : normal rate  [Regular Rhythm] : with a regular rhythm [Normal S1, S2] : normal S1 and S2 [No Carotid Bruits] : no carotid bruits [No Abdominal Bruit] : a ~M bruit was not heard ~T in the abdomen [No Varicosities] : no varicosities [Pedal Pulses Present] : the pedal pulses are present [No Edema] : there was no peripheral edema [No Palpable Aorta] : no palpable aorta [No Extremity Clubbing/Cyanosis] : no extremity clubbing/cyanosis [Soft] : abdomen soft [Non Tender] : non-tender [Non-distended] : non-distended [No Masses] : no abdominal mass palpated [No HSM] : no HSM [Normal Bowel Sounds] : normal bowel sounds [Normal Posterior Cervical Nodes] : no posterior cervical lymphadenopathy [Normal Anterior Cervical Nodes] : no anterior cervical lymphadenopathy [No CVA Tenderness] : no CVA  tenderness [No Spinal Tenderness] : no spinal tenderness [Grossly Normal Strength/Tone] : grossly normal strength/tone [No Rash] : no rash [Coordination Grossly Intact] : coordination grossly intact [No Focal Deficits] : no focal deficits [Normal Gait] : normal gait [Deep Tendon Reflexes (DTR)] : deep tendon reflexes were 2+ and symmetric [Normal Affect] : the affect was normal [Normal Insight/Judgement] : insight and judgment were intact [Normal] : affect was normal and insight and judgment were intact [de-identified] : Gr 1-2 /6 systolic M [de-identified] : mild Left knee swelling healed  vertical incisions x 2  [de-identified] : decrease sensation to pinprick left knee

## 2022-02-17 NOTE — HEALTH RISK ASSESSMENT
[None] : None [With Significant Other] : lives with significant other [Employed] : employed [College] : College [Feels Safe at Home] : Feels safe at home [Fully functional (bathing, dressing, toileting, transferring, walking, feeding)] : Fully functional (bathing, dressing, toileting, transferring, walking, feeding) [Fully functional (using the telephone, shopping, preparing meals, housekeeping, doing laundry, using] : Fully functional and needs no help or supervision to perform IADLs (using the telephone, shopping, preparing meals, housekeeping, doing laundry, using transportation, managing medications and managing finances) [Smoke Detector] : smoke detector [Carbon Monoxide Detector] : carbon monoxide detector [Change in mental status noted] : No change in mental status noted [Reports changes in hearing] : Reports no changes in hearing [Reports changes in vision] : Reports no changes in vision [Reports changes in dental health] : Reports no changes in dental health [de-identified] : teacher [de-identified] : wears hearing aids

## 2022-02-17 NOTE — HISTORY OF PRESENT ILLNESS
[de-identified] : 72y/o F with PMHx of HLD (Atorvastatin) and GERD presents to the office for routine Medicare Annual Wellness Exam. Pt brings forms for PE from work.Pt had difficult postop course after left knee replacement sustaining a fracture under the prosthesis.Underwent another surgery.Pt still has PT but getting better less pain and can now ambulate with minimal pain. Labs reviewed Lipids elevated

## 2022-05-18 ENCOUNTER — APPOINTMENT (OUTPATIENT)
Dept: FAMILY MEDICINE | Facility: CLINIC | Age: 72
End: 2022-05-18
Payer: MEDICARE

## 2022-05-18 VITALS
SYSTOLIC BLOOD PRESSURE: 120 MMHG | HEART RATE: 81 BPM | TEMPERATURE: 99.5 F | OXYGEN SATURATION: 97 % | RESPIRATION RATE: 14 BRPM | DIASTOLIC BLOOD PRESSURE: 84 MMHG

## 2022-05-18 DIAGNOSIS — Z11.59 ENCOUNTER FOR SCREENING FOR OTHER VIRAL DISEASES: ICD-10-CM

## 2022-05-18 DIAGNOSIS — J02.9 ACUTE PHARYNGITIS, UNSPECIFIED: ICD-10-CM

## 2022-05-18 PROCEDURE — 99214 OFFICE O/P EST MOD 30 MIN: CPT

## 2022-05-21 PROBLEM — Z11.59 SCREENING FOR VIRAL DISEASE: Status: ACTIVE | Noted: 2022-05-18

## 2022-05-21 PROBLEM — J02.9 SORE THROAT: Status: RESOLVED | Noted: 2022-05-21 | Resolved: 2022-06-20

## 2022-05-22 NOTE — PHYSICAL EXAM
[Normal Outer Ear/Nose] : the outer ears and nose were normal in appearance [Normal TMs] : both tympanic membranes were normal [No Lymphadenopathy] : no lymphadenopathy [Normal Rate] : normal rate  [Regular Rhythm] : with a regular rhythm [Coordination Grossly Intact] : coordination grossly intact [No Focal Deficits] : no focal deficits [Normal Gait] : normal gait [Speech Grossly Normal] : speech grossly normal [Alert and Oriented x3] : oriented to person, place, and time [Normal Mood] : the mood was normal [Normal] : affect was normal and insight and judgment were intact [de-identified] : PND

## 2022-05-22 NOTE — HISTORY OF PRESENT ILLNESS
[FreeTextEntry8] : 73 yo female with PMHx HLD (atorvastatin) GERD (pantoprazole) hearing loss, presents to the office for a f/u after urgent care visit. the patient states going to [facility] yesterday, was prescribed amoxicillin 875mg BID. the patient states still having a sore throat and a dry cough, wanted to know if there's anything else she can do. she reports testing negative for COVID at urgent care.

## 2022-05-22 NOTE — REVIEW OF SYSTEMS
[Hearing Loss] : hearing loss [Nasal Discharge] : nasal discharge [Sore Throat] : sore throat [Postnasal Drip] : postnasal drip [Cough] : cough [Negative] : Psychiatric [Earache] : no earache [Nosebleed] : no nosebleeds [Hoarseness] : no hoarseness [Chest Pain] : no chest pain [Palpitations] : no palpitations [Shortness Of Breath] : no shortness of breath [Wheezing] : no wheezing [Dyspnea on Exertion] : no dyspnea on exertion [FreeTextEntry4] : chronic hearing loss

## 2022-05-22 NOTE — ADDENDUM
[FreeTextEntry1] : I, Dr. Reynaga, personally performed the evaluation and management (E/M) services for this established patient who presents today with (a) new problems(s)/exacerbation of (an) existing condition(s). That E/M includes conducting the examination, assessing all new/exacerbated conditions, and establishing a new plan of care. Today, my nurse practitioner, Marielena Jean Baptiste, was here to observe my evaluation and management services for this new problem/exacerbated condition to be followed going forward.\par

## 2022-09-07 ENCOUNTER — RX RENEWAL (OUTPATIENT)
Age: 72
End: 2022-09-07

## 2022-09-16 ENCOUNTER — APPOINTMENT (OUTPATIENT)
Dept: FAMILY MEDICINE | Facility: CLINIC | Age: 72
End: 2022-09-16

## 2022-09-16 DIAGNOSIS — M25.561 PAIN IN RIGHT KNEE: ICD-10-CM

## 2022-09-16 DIAGNOSIS — H66.90 OTITIS MEDIA, UNSPECIFIED, UNSPECIFIED EAR: ICD-10-CM

## 2022-09-16 DIAGNOSIS — Z71.84 ENC FOR HEALTH COUNSELING RELATED TO TRAVEL: ICD-10-CM

## 2022-09-16 DIAGNOSIS — M25.562 PAIN IN RIGHT KNEE: ICD-10-CM

## 2022-09-16 DIAGNOSIS — Z71.85 ENCOUNTER FOR IMMUNIZATION SAFETY COUNSELING: ICD-10-CM

## 2022-09-16 PROCEDURE — 99213 OFFICE O/P EST LOW 20 MIN: CPT

## 2022-09-16 NOTE — HISTORY OF PRESENT ILLNESS
[FreeTextEntry8] : 71y/o F with PMHx of HLD (Atorvastatin) and GERD presents to office for travel advice.Pt will be going in 4 weeks on a 28 day cruise to [place]. HAs tranderm patches that expird 2018. Also had flu shot last week still having bruising. Had a lot of bleeding after shot according to patient. Pt admits to taking 8 Advil daily for chronic knee pain.

## 2022-09-19 RX ORDER — SCOPOLAMINE 1.5 MG/1
1 PATCH, EXTENDED RELEASE TRANSDERMAL
Qty: 3 | Refills: 3 | Status: ACTIVE | COMMUNITY
Start: 2022-09-19 | End: 1900-01-01

## 2022-11-27 DIAGNOSIS — Z12.39 ENCOUNTER FOR OTHER SCREENING FOR MALIGNANT NEOPLASM OF BREAST: ICD-10-CM

## 2022-12-31 ENCOUNTER — NON-APPOINTMENT (OUTPATIENT)
Age: 72
End: 2022-12-31

## 2023-02-01 ENCOUNTER — NON-APPOINTMENT (OUTPATIENT)
Age: 73
End: 2023-02-01

## 2023-02-03 ENCOUNTER — APPOINTMENT (OUTPATIENT)
Dept: FAMILY MEDICINE | Facility: CLINIC | Age: 73
End: 2023-02-03
Payer: MEDICARE

## 2023-02-03 VITALS
TEMPERATURE: 97.2 F | HEART RATE: 94 BPM | SYSTOLIC BLOOD PRESSURE: 124 MMHG | OXYGEN SATURATION: 97 % | RESPIRATION RATE: 14 BRPM | DIASTOLIC BLOOD PRESSURE: 80 MMHG

## 2023-02-03 PROCEDURE — 99214 OFFICE O/P EST MOD 30 MIN: CPT

## 2023-02-03 RX ORDER — FLUTICASONE PROPIONATE 50 UG/1
50 SPRAY, METERED NASAL TWICE DAILY
Qty: 1 | Refills: 1 | Status: ACTIVE | COMMUNITY
Start: 2023-02-03 | End: 1900-01-01

## 2023-02-13 NOTE — HISTORY OF PRESENT ILLNESS
[FreeTextEntry8] : c/o cough x 1 week\par yesterday went to  \par cxr negative \par cough peristent and worse at night \par using robutussiun prn\par hx of prior smoking, quit 15 years ago\par denies any fever or body aches or sinus pressure\par denies any other associated symptoms\par denies any other complaints or concerns at this time

## 2023-02-13 NOTE — PHYSICAL EXAM
[Normal] : no posterior cervical lymphadenopathy and no anterior cervical lymphadenopathy [de-identified] : + transmitted upper airway sounds

## 2023-02-13 NOTE — REVIEW OF SYSTEMS
[Nasal Discharge] : nasal discharge [Cough] : cough [Negative] : Cardiovascular <-- Click to add NO pertinent Family History

## 2023-02-19 ENCOUNTER — NON-APPOINTMENT (OUTPATIENT)
Age: 73
End: 2023-02-19

## 2023-02-21 ENCOUNTER — NON-APPOINTMENT (OUTPATIENT)
Age: 73
End: 2023-02-21

## 2023-02-21 ENCOUNTER — APPOINTMENT (OUTPATIENT)
Dept: FAMILY MEDICINE | Facility: CLINIC | Age: 73
End: 2023-02-21
Payer: MEDICARE

## 2023-02-21 VITALS
HEIGHT: 60.43 IN | DIASTOLIC BLOOD PRESSURE: 76 MMHG | TEMPERATURE: 97.5 F | SYSTOLIC BLOOD PRESSURE: 124 MMHG | RESPIRATION RATE: 14 BRPM | BODY MASS INDEX: 32.59 KG/M2 | WEIGHT: 168.2 LBS | OXYGEN SATURATION: 96 % | HEART RATE: 89 BPM

## 2023-02-21 DIAGNOSIS — Z11.1 ENCOUNTER FOR SCREENING FOR RESPIRATORY TUBERCULOSIS: ICD-10-CM

## 2023-02-21 DIAGNOSIS — H91.93 UNSPECIFIED HEARING LOSS, BILATERAL: ICD-10-CM

## 2023-02-21 DIAGNOSIS — M25.569 PAIN IN UNSPECIFIED KNEE: ICD-10-CM

## 2023-02-21 PROCEDURE — G0439: CPT

## 2023-02-21 PROCEDURE — 93000 ELECTROCARDIOGRAM COMPLETE: CPT

## 2023-02-21 NOTE — PHYSICAL EXAM
[No Acute Distress] : no acute distress [Well Nourished] : well nourished [Well Developed] : well developed [Well-Appearing] : well-appearing [Normal Sclera/Conjunctiva] : normal sclera/conjunctiva [PERRL] : pupils equal round and reactive to light [EOMI] : extraocular movements intact [Normal Outer Ear/Nose] : the outer ears and nose were normal in appearance [Normal Oropharynx] : the oropharynx was normal [No JVD] : no jugular venous distention [No Lymphadenopathy] : no lymphadenopathy [Supple] : supple [Thyroid Normal, No Nodules] : the thyroid was normal and there were no nodules present [No Respiratory Distress] : no respiratory distress  [No Accessory Muscle Use] : no accessory muscle use [Clear to Auscultation] : lungs were clear to auscultation bilaterally [Normal Rate] : normal rate  [Regular Rhythm] : with a regular rhythm [Normal S1, S2] : normal S1 and S2 [No Carotid Bruits] : no carotid bruits [No Abdominal Bruit] : a ~M bruit was not heard ~T in the abdomen [No Varicosities] : no varicosities [Pedal Pulses Present] : the pedal pulses are present [No Edema] : there was no peripheral edema [No Palpable Aorta] : no palpable aorta [No Extremity Clubbing/Cyanosis] : no extremity clubbing/cyanosis [Soft] : abdomen soft [Non Tender] : non-tender [Non-distended] : non-distended [No Masses] : no abdominal mass palpated [No HSM] : no HSM [Normal Bowel Sounds] : normal bowel sounds [Normal Posterior Cervical Nodes] : no posterior cervical lymphadenopathy [Normal Anterior Cervical Nodes] : no anterior cervical lymphadenopathy [No CVA Tenderness] : no CVA  tenderness [No Spinal Tenderness] : no spinal tenderness [No Joint Swelling] : no joint swelling [Grossly Normal Strength/Tone] : grossly normal strength/tone [No Rash] : no rash [Coordination Grossly Intact] : coordination grossly intact [No Focal Deficits] : no focal deficits [Normal Gait] : normal gait [Deep Tendon Reflexes (DTR)] : deep tendon reflexes were 2+ and symmetric [Normal Affect] : the affect was normal [Normal Insight/Judgement] : insight and judgment were intact [de-identified] : Gr 12/6 systolic M

## 2023-02-21 NOTE — HEALTH RISK ASSESSMENT
[None] : None [With Significant Other] : lives with significant other [] :  [Fully functional (bathing, dressing, toileting, transferring, walking, feeding)] : Fully functional (bathing, dressing, toileting, transferring, walking, feeding) [Fully functional (using the telephone, shopping, preparing meals, housekeeping, doing laundry, using] : Fully functional and needs no help or supervision to perform IADLs (using the telephone, shopping, preparing meals, housekeeping, doing laundry, using transportation, managing medications and managing finances) [Reports changes in hearing] : Reports changes in hearing [With Patient/Caregiver] : , with patient/caregiver [Designated Healthcare Proxy] : Designated healthcare proxy [Name: ___] : Health Care Proxy's Name: [unfilled]  [Relationship: ___] : Relationship: [unfilled] [Change in mental status noted] : No change in mental status noted [Reports changes in vision] : Reports no changes in vision [Reports changes in dental health] : Reports no changes in dental health [Smoke Detector] : no smoke detector [Carbon Monoxide Detector] : no carbon monoxide detector [Guns at Home] : no guns at home [MammogramComments] : next month [PapSmearDate] : 5/2022

## 2023-02-21 NOTE — HISTORY OF PRESENT ILLNESS
[de-identified] : 72y/o F with PMHx of HLD (Atorvastatin) and GERD presents to the office for routine Medicare Annual Wellness Exam.Has had cough off and on for 5 months. Dry cough. No fever or SOB. Completed Covid 3 shots Continues to have left knee pain despite 2 knee surgeries. Hs intermittent pain.

## 2023-02-21 NOTE — PHYSICAL EXAM
[No Acute Distress] : no acute distress [Well Nourished] : well nourished [Well Developed] : well developed [Well-Appearing] : well-appearing [Normal Sclera/Conjunctiva] : normal sclera/conjunctiva [PERRL] : pupils equal round and reactive to light [EOMI] : extraocular movements intact [Normal Outer Ear/Nose] : the outer ears and nose were normal in appearance [Normal Oropharynx] : the oropharynx was normal [No JVD] : no jugular venous distention [No Lymphadenopathy] : no lymphadenopathy [Supple] : supple [Thyroid Normal, No Nodules] : the thyroid was normal and there were no nodules present [No Respiratory Distress] : no respiratory distress  [No Accessory Muscle Use] : no accessory muscle use [Clear to Auscultation] : lungs were clear to auscultation bilaterally [Normal Rate] : normal rate  [Regular Rhythm] : with a regular rhythm [Normal S1, S2] : normal S1 and S2 [No Carotid Bruits] : no carotid bruits [No Abdominal Bruit] : a ~M bruit was not heard ~T in the abdomen [No Varicosities] : no varicosities [Pedal Pulses Present] : the pedal pulses are present [No Edema] : there was no peripheral edema [No Palpable Aorta] : no palpable aorta [No Extremity Clubbing/Cyanosis] : no extremity clubbing/cyanosis [Soft] : abdomen soft [Non Tender] : non-tender [Non-distended] : non-distended [No Masses] : no abdominal mass palpated [No HSM] : no HSM [Normal Bowel Sounds] : normal bowel sounds [Normal Posterior Cervical Nodes] : no posterior cervical lymphadenopathy [Normal Anterior Cervical Nodes] : no anterior cervical lymphadenopathy [No CVA Tenderness] : no CVA  tenderness [No Spinal Tenderness] : no spinal tenderness [No Joint Swelling] : no joint swelling [Grossly Normal Strength/Tone] : grossly normal strength/tone [No Rash] : no rash [Coordination Grossly Intact] : coordination grossly intact [No Focal Deficits] : no focal deficits [Normal Gait] : normal gait [Deep Tendon Reflexes (DTR)] : deep tendon reflexes were 2+ and symmetric [Normal Affect] : the affect was normal [Normal Insight/Judgement] : insight and judgment were intact [de-identified] : Gr 1-2/6 systoilic M

## 2023-02-21 NOTE — HEALTH RISK ASSESSMENT
[With Significant Other] : lives with significant other [Employed] : employed [College] : College [] :  [Smoke Detector] : smoke detector [Guns at Home] : guns at home [With Patient/Caregiver] : , with patient/caregiver [Designated Healthcare Proxy] : Designated healthcare proxy [Name: ___] : Health Care Proxy's Name: [unfilled]  [Relationship: ___] : Relationship: [unfilled] [Change in mental status noted] : No change in mental status noted [Carbon Monoxide Detector] : no carbon monoxide detector [FreeTextEntry2] : teacher

## 2023-02-21 NOTE — HISTORY OF PRESENT ILLNESS
[de-identified] : 73y/o F with PMHx of HLD (Atorvastatin) and GERD presents to the office for routine Medicare Annual Wellness Exam. Pt brings forms for PE from work.Pt has 5 months left foot tendinitis continues to have bump on foot. MRI revealed Tendinitis. UTD with immunizations. Continues to have  right knee issues post Partial LKR.  LAbs reviewed WNL

## 2023-02-24 ENCOUNTER — NON-APPOINTMENT (OUTPATIENT)
Age: 73
End: 2023-02-24

## 2023-03-05 ENCOUNTER — NON-APPOINTMENT (OUTPATIENT)
Age: 73
End: 2023-03-05

## 2023-03-09 ENCOUNTER — APPOINTMENT (OUTPATIENT)
Dept: ORTHOPEDIC SURGERY | Facility: CLINIC | Age: 73
End: 2023-03-09
Payer: MEDICARE

## 2023-03-09 VITALS
HEIGHT: 62 IN | BODY MASS INDEX: 30.91 KG/M2 | DIASTOLIC BLOOD PRESSURE: 81 MMHG | OXYGEN SATURATION: 98 % | HEART RATE: 75 BPM | WEIGHT: 168 LBS | TEMPERATURE: 97.8 F | SYSTOLIC BLOOD PRESSURE: 127 MMHG

## 2023-03-09 PROCEDURE — 73564 X-RAY EXAM KNEE 4 OR MORE: CPT | Mod: LT

## 2023-03-09 PROCEDURE — 20610 DRAIN/INJ JOINT/BURSA W/O US: CPT | Mod: LT

## 2023-03-09 PROCEDURE — 99205 OFFICE O/P NEW HI 60 MIN: CPT | Mod: 25

## 2023-03-09 NOTE — HISTORY OF PRESENT ILLNESS
[de-identified] : This is very nice 73-year-old female experiencing left medial knee pain, which is severe in intensity. The pain substantially limits activities of daily living. Walking tolerance is reduced.  History of a left medial unicompartmental knee arthroplasty October 12, 2020 by Dr. Rickie To at the [facility] for special surgery.  5 days later she sustained a medial tibial plateau fracture treated with an open reduction internal fixation by another surgeon.  She has never felt right since the surgery.  She has pain on the medial aspect of the knee.  The knee gives way sometimes.  She tried a pes bursa injection 1 week ago which did not help.  Medication and activity modification have been minimally effective for a period lasting greater than three months in duration. Assistive devices and external support were not deemed by the patient to be helpful in improving their function. Due to the severity of osteoarthritis and level of pain, physical therapy is contraindicated. Pain and restriction of function are intolerable at this time. The patient denies any radiation of the pain to the feet and it is not associated with numbness, tingling, or weakness.

## 2023-03-09 NOTE — PROCEDURE
[de-identified] : Informed consent for the left knee aspiration was obtained. All risks, benefits and alternatives were discussed. These included but were not limited to bleeding, infection, allergic reaction and reaccumulation of fluid.  All questions were answered. A time out was performed. The left knee was prepped and draped in sterile fashion. Using sterile technique, the left knee was aspirated of approximately 3 cc of clear but blood-tinged fluid using a 18-gauge needle. A sterile dressing was applied. Post aspiration instructions were reviewed. The patient tolerated the procedure well.\par

## 2023-03-09 NOTE — PHYSICAL EXAM
[de-identified] : Patient is well nourished, well-developed, in no acute distress, with appropriate mood and affect. The patient is oriented to time, place, and person. Respirations are even and unlabored. Gait evaluation does not reveal a limp. There is no inguinal adenopathy. Examination of the contralateral knee shows normal range of motion, strength, no tenderness, and intact skin. The operative limb is well-perfused, has a well appearing surgical scar, and shows a grossly normal motor and sensory examination. Knee motion is painless and the left knee moves from 5-115 degrees. The knee is stable within that range-of-motion to AP and ML stress although the knee does open to approximately 10 degrees to valgus stress with a solid endpoint. The alignment of the knee is neutral. Muscle strength is normal. Quadriceps and hamstring muscle strength is normal bilaterally. Pedal pulses are palpable.  Tender to palpation over the pes anserine bursa. [de-identified] : AP, lateral, tunnel, and sunrise knee x-rays of the left knee were ordered and obtained in the office and demonstrate status post left medial unicompartmental knee arthroplasty.  The tibial component shows possible evidence of loosening with radiolucent lines.  There is a medial tibial plate noted.  The fracture appears healed.

## 2023-03-09 NOTE — DISCUSSION/SUMMARY
[de-identified] : This patient has a painful and failed left unicompartmental knee arthroplasty.  She has pes anserine bursitis.  I think that this is because of the instability from the tibial plateau piece being somewhat subsided from prior fracture.  She has failed a course of conservative management and would like to consider proceeding with a left total knee arthroplasty conversion revision and possible hardware removal.  We discussed that we may only remove the top 2 screws and possibly cut the plate if needed.  To rule out infection today we performed an aspiration of the left knee and this will be sent for cell count and culture.  I explained to the patient that the best estimate I can give her for pain improvement would be 85%.  There is no guarantee that she would have pain improvement if she has nerve injury.\par \par The patient is an appropriate candidate for consideration of left revision total knee arthroplasty and hardware removal with Tony robotic navigation assistance. This recommendation is based on the patient's pain, function, and bone stock. An extensive discussion was conducted of the natural history of this particular problem and the variety of surgical and non-surgical treatment options available to the patient. A risk/benefit analysis was discussed with the patient reviewing the advantages and disadvantages of surgical intervention at this time. A full explanation was given of the nature and the purpose of the procedure and anesthesia, its benefits, possible alternative methods of diagnosis or treatment, the risks involved, the possibility of complications, the foreseeable consequences of the procedure and the possible results of the non-treatment. I reviewed the plan of care and I also used a model of a revision joint replacement implant equivalent to the one that will be used for their revision total joint replacement. The ability to secure the implant utilizing cement or cementless (press-fit) was discussed with the patient. The patient agrees with the plan of care, as well as the use of implants for their revision joint replacement. \par \par No guarantee or assurance was made as to the results that may be obtained. Specifically, the risks were identified to include, but are not limited to the following: Infection, phlebitis, pulmonary embolism, death, paralysis, dislocation, pain, stiffness, instability, limp, weakness, breakage, leg-length inequality, uncontrolled bleeding, nerve injury, blood vessel injury, pressure sores, anesthetic risks, delayed healing of wound and bone, and wear and loosening. Further discussion was undertaken with the patient about the details of surgical preparation, treatment, and postoperative rehabilitation including medical clearance, autotransfusion, the hospital course, and the postoperative rehabilitation involved. Reimplantation may require cemented or cementless components, or both, depending upon a variety of factors that must be assessed at the time of surgery. The need for bone graft (either autograft or allograft) to enhance the chance for success of the procedure(s) was discussed. All in all, I feel that this patient is a good candidate for surgical reconstruction.\par \par The patient and I discussed the current SARS-CoV-2 (COVID-19) pandemic which has affected our local hospitals. We discussed that our hospitals treat patients with COVID-19. All efforts will be made to avoid cohorting the patient with diagnosed or suspected COVID-19 patient. They also understand that we will screen them 24-48 hours prior to surgery. Despite our best efforts, there is a potential risk for iatrogenic transmission of COVID-19 to the patient during the perioperative period. Raghav COVID-19 during the perioperative period may increase the patient´s risks of an adverse outcome including postoperative pneumonia, difficulty breathing, requirement for a breathing tube (general endotracheal intubation), and death. The patient is understanding of this risk, and is willing to proceed with surgery at this time.

## 2023-03-10 ENCOUNTER — APPOINTMENT (OUTPATIENT)
Dept: PULMONOLOGY | Facility: CLINIC | Age: 73
End: 2023-03-10
Payer: MEDICARE

## 2023-03-10 VITALS
SYSTOLIC BLOOD PRESSURE: 136 MMHG | RESPIRATION RATE: 14 BRPM | DIASTOLIC BLOOD PRESSURE: 81 MMHG | HEART RATE: 79 BPM | OXYGEN SATURATION: 96 %

## 2023-03-10 LAB
B PERT IGG+IGM PNL SER: ABNORMAL
COLOR FLD: NORMAL
EOSINOPHIL # FLD MANUAL: 2 %
FLUID INTAKE SUBSTANCE CLASS: NORMAL
LYMPHOCYTES # FLD MANUAL: 8 %
MONOS+MACROS NFR FLD MANUAL: 16 %
NEUTS SEG # FLD MANUAL: 74 %
RBC # FLD MANUAL: ABNORMAL /UL
TOTAL CELLS COUNTED FLD: 930 /UL
TUBE TYPE: NORMAL

## 2023-03-10 PROCEDURE — 94727 GAS DIL/WSHOT DETER LNG VOL: CPT

## 2023-03-10 PROCEDURE — 99205 OFFICE O/P NEW HI 60 MIN: CPT | Mod: 25

## 2023-03-10 PROCEDURE — 94010 BREATHING CAPACITY TEST: CPT

## 2023-03-10 PROCEDURE — 95012 NITRIC OXIDE EXP GAS DETER: CPT

## 2023-03-10 PROCEDURE — 71046 X-RAY EXAM CHEST 2 VIEWS: CPT

## 2023-03-10 PROCEDURE — ZZZZZ: CPT

## 2023-03-10 PROCEDURE — 94729 DIFFUSING CAPACITY: CPT

## 2023-03-10 RX ORDER — AMOXICILLIN 500 MG/1
500 TABLET, FILM COATED ORAL 3 TIMES DAILY
Qty: 21 | Refills: 1 | Status: DISCONTINUED | COMMUNITY
Start: 2021-06-24 | End: 2023-03-10

## 2023-03-10 NOTE — PROCEDURE
Reviewed by . CPM. Repeat in 6 months. Pt to call Dr. Pelaez's office for appt. [FreeTextEntry1] : PFT 3/10/23\par  Flow rates nl\par Lung Volumes nl\par DLCO 78 %\par HGB 12.5\par \par NIOX  17  ppb nl  range 3/10/23\par \par X-ray PA lateral March 10, 2023\par Only fair inspiratory effort\par Cardiac size grossly normal\par Lung fields are grossly clear without parenchymal infiltrates pleural effusions or dominant pulmonary nodules\par

## 2023-03-10 NOTE — PHYSICAL EXAM
[No Acute Distress] : no acute distress [Normal Oropharynx] : normal oropharynx [II] : Mallampati Class: II [Normal Appearance] : normal appearance [Supple] : supple [No JVD] : no jvd [Normal Rate/Rhythm] : normal rate/rhythm [Normal PMI] : normal pmi [Normal S1, S2] : normal s1, s2 [No Murmurs] : no murmurs [No Rubs] : no rubs [No Resp Distress] : no resp distress [No Acc Muscle Use] : no acc muscle use [Normal Rhythm and Effort] : normal rhythm and effort [Clear to Auscultation Bilaterally] : clear to auscultation bilaterally [No Abnormalities] : no abnormalities [Benign] : benign [Not Tender] : not tender [Soft] : soft [No HSM] : no hsm [Normal Bowel Sounds] : normal bowel sounds [Normal Gait] : normal gait [No Clubbing] : no clubbing [No Cyanosis] : no cyanosis [No Edema] : no edema [FROM] : FROM [Normal Color/ Pigmentation] : normal color/ pigmentation [No Focal Deficits] : no focal deficits [Oriented x3] : oriented x3 [Normal Affect] : normal affect

## 2023-03-10 NOTE — HISTORY OF PRESENT ILLNESS
[Never] : never [TextBox_4] : 73-year-old female\par Chief complaint chronic cough\par Onset of cough September 2022\par She was diagnosed multiple times with bronchitis\par Treated with Z-Tom Tessalon Perles Flonase in the past has been treated with Hydromet additional antibiotics including cefuroxime\par She had several urgent care visits including primary care visit told clear lungs\par She does not carry a formal diagnosis of asthma pneumonia COPD emphysema interstitial lung disease\par She denies any difficulty swallowing\par She does have significant hearing loss\par No purulent sputum reported unclear if there is any associated wheeze with her nocturnal cough that awakens\par No history of hemoptysis\par Denies ever having a known COVID-19 infection\par COVID-vaccine up to date

## 2023-03-10 NOTE — REVIEW OF SYSTEMS
[Cough] : cough [Seasonal Allergies] : seasonal allergies [GERD] : gerd [Negative] : Endocrine [Fever] : no fever [Chills] : no chills [TextBox_14] : Hearing loss [TextBox_44] : NILDA [TextBox_83] : s

## 2023-03-10 NOTE — DISCUSSION/SUMMARY
[FreeTextEntry1] : Chronic inflammatory cough\par Pulmonary physiology normal\par Nitric oxide normal\par Chest x-ray normal\par Recommendations\par Trial of Spiriva for possible cough variant asthma 2.5 mcg 2 puffs daily with instructions\par Advised most common side effect is dry mouth\par Singulair 10 mg 1 tablet p.o. daily with dinner to see if this helps overnight cough symptomatology\par Return in 1 month\par If no interval improvement dynamic CT chest to rule out tracheomalacia

## 2023-03-11 LAB
SYCRY CLARITY: ABNORMAL
SYCRY COLOR: ABNORMAL
SYCRY ID: ABNORMAL
SYCRY TUBE: NORMAL

## 2023-03-24 LAB — JOINT CULTURE: NORMAL

## 2023-03-31 ENCOUNTER — APPOINTMENT (OUTPATIENT)
Dept: FAMILY MEDICINE | Facility: CLINIC | Age: 73
End: 2023-03-31
Payer: MEDICARE

## 2023-03-31 VITALS
HEART RATE: 91 BPM | RESPIRATION RATE: 14 BRPM | DIASTOLIC BLOOD PRESSURE: 70 MMHG | SYSTOLIC BLOOD PRESSURE: 132 MMHG | OXYGEN SATURATION: 97 % | TEMPERATURE: 97.8 F

## 2023-03-31 DIAGNOSIS — G89.29 PAIN IN LEFT KNEE: ICD-10-CM

## 2023-03-31 DIAGNOSIS — M25.562 PAIN IN LEFT KNEE: ICD-10-CM

## 2023-03-31 PROCEDURE — 99215 OFFICE O/P EST HI 40 MIN: CPT

## 2023-03-31 NOTE — PHYSICAL EXAM
[Normal] : affect was normal and insight and judgment were intact [de-identified] : TTP left medial knee

## 2023-03-31 NOTE — HISTORY OF PRESENT ILLNESS
[FreeTextEntry8] : 72 y/o F presents to office accompanied by her  to dosuss upcoming surgery.Pt had left knee replacement  2020. Several days after  surgery   she sustained a  medial tibial plateau fracture. Hs had pain since surgery in left knee. Pt weill be having a revision of the left knee replacement 8/2023. She comes in with her  Marco to discuss ther Robotic surgery and her concerns. Pt is deaf and wears hearing aids. HAs tried to contact Patient Advocacy Dept at [facility] to discuss upcoming surgery (8/2023) however has met with much difficulty. According to ADA patient can request and is entitled to certain accommodations.

## 2023-04-07 ENCOUNTER — NON-APPOINTMENT (OUTPATIENT)
Age: 73
End: 2023-04-07

## 2023-04-10 LAB — FUNGUS FLD CULT: NORMAL

## 2023-04-14 ENCOUNTER — APPOINTMENT (OUTPATIENT)
Dept: PULMONOLOGY | Facility: CLINIC | Age: 73
End: 2023-04-14
Payer: MEDICARE

## 2023-04-14 VITALS — DIASTOLIC BLOOD PRESSURE: 80 MMHG | SYSTOLIC BLOOD PRESSURE: 138 MMHG

## 2023-04-14 VITALS — HEART RATE: 78 BPM | OXYGEN SATURATION: 96 %

## 2023-04-14 DIAGNOSIS — J20.9 ACUTE BRONCHITIS, UNSPECIFIED: ICD-10-CM

## 2023-04-14 PROCEDURE — 99213 OFFICE O/P EST LOW 20 MIN: CPT

## 2023-04-14 NOTE — DISCUSSION/SUMMARY
[FreeTextEntry1] : Chronic inflammatory cough\par Pulmonary physiology normal\par Nitric oxide normal\par Chest x-ray normal\par Recommendations\par Trial of Spiriva for possible cough variant asthma 2.5 mcg 2 puffs daily with instructions\par Advised most common side effect is dry mouth\par Singulair 10 mg 1 tablet p.o. daily with dinner to see if this helps overnight cough symptomatology\par Return in 3 month\par

## 2023-04-14 NOTE — HISTORY OF PRESENT ILLNESS
[Never] : never [TextBox_4] : 73-year-old female\par cough much  better\par s/p ENT Dr Howard\par sent to  allergy\par testing neg\par pred tx and cough better\par Chief complaint chronic cough\par ? pt relates prior COVID vaccine\par Onset of cough September 2022\par She was diagnosed multiple times with bronchitis\par Treated with Z-Tom Tessalon Perles Flonase in the past has been treated with Hydromet additional antibiotics including cefuroxime\par She had several urgent care visits including primary care visit told clear lungs\par She does not carry a formal diagnosis of asthma pneumonia COPD emphysema interstitial lung disease\par She denies any difficulty swallowing\par She does have significant hearing loss\par No purulent sputum reported unclear if there is any associated wheeze with her nocturnal cough that awakens\par No history of hemoptysis\par Denies ever having a known COVID-19 infection\par COVID-vaccine up to date

## 2023-04-14 NOTE — REVIEW OF SYSTEMS
[Cough] : cough [Seasonal Allergies] : seasonal allergies [GERD] : gerd [Negative] : Endocrine [Fever] : no fever [Chills] : no chills [TextBox_14] : Hearing loss [TextBox_44] : NILDA

## 2023-04-14 NOTE — PROCEDURE
[FreeTextEntry1] : PFT 3/10/23\par  Flow rates nl\par Lung Volumes nl\par DLCO 78 %\par HGB 12.5\par \par NIOX  17  ppb nl  range 3/10/23\par \par X-ray PA lateral March 10, 2023\par Only fair inspiratory effort\par Cardiac size grossly normal\par Lung fields are grossly clear without parenchymal infiltrates pleural effusions or dominant pulmonary nodules\par

## 2023-04-19 ENCOUNTER — NON-APPOINTMENT (OUTPATIENT)
Age: 73
End: 2023-04-19

## 2023-04-28 ENCOUNTER — APPOINTMENT (OUTPATIENT)
Dept: ORTHOPEDIC SURGERY | Facility: CLINIC | Age: 73
End: 2023-04-28
Payer: MEDICARE

## 2023-04-28 DIAGNOSIS — Z96.652 PAIN DUE TO INTERNAL ORTHOPEDIC PROSTHETIC DEVICES, IMPLANTS AND GRAFTS, INITIAL ENCOUNTER: ICD-10-CM

## 2023-04-28 DIAGNOSIS — T84.84XA PAIN DUE TO INTERNAL ORTHOPEDIC PROSTHETIC DEVICES, IMPLANTS AND GRAFTS, INITIAL ENCOUNTER: ICD-10-CM

## 2023-04-28 PROCEDURE — 99215 OFFICE O/P EST HI 40 MIN: CPT

## 2023-04-28 NOTE — HISTORY OF PRESENT ILLNESS
[de-identified] : This is very nice 73-year-old female experiencing left medial knee pain, which is severe in intensity. The pain substantially limits activities of daily living. Walking tolerance is reduced.  History of a left medial unicompartmental knee arthroplasty October 12, 2020 by Dr. Rickie To at [facility].  5 days later she sustained a medial tibial plateau fracture treated with an open reduction internal fixation by another surgeon.  She has never felt right since the surgery.  She has pain on the medial aspect of the knee.  The knee gives way sometimes.  She tried a pes bursa injection 1 week ago which did not help.  Medication and activity modification have been minimally effective for a period lasting greater than three months in duration. Assistive devices and external support were not deemed by the patient to be helpful in improving their function. Due to the severity of osteoarthritis and level of pain, physical therapy is contraindicated. Pain and restriction of function are intolerable at this time. The patient denies any radiation of the pain to the feet and it is not associated with numbness, tingling, or weakness. Here to rediscuss all of her concerns.

## 2023-04-28 NOTE — PHYSICAL EXAM
[de-identified] : Patient is well nourished, well-developed, in no acute distress, with appropriate mood and affect. The patient is oriented to time, place, and person. Respirations are even and unlabored. Gait evaluation does not reveal a limp. There is no inguinal adenopathy. Examination of the contralateral knee shows normal range of motion, strength, no tenderness, and intact skin. The operative limb is well-perfused, has a well appearing surgical scar, and shows a grossly normal motor and sensory examination. Knee motion is painless and the left knee moves from 5-115 degrees. The knee is stable within that range-of-motion to AP and ML stress although the knee does open to approximately 10 degrees to valgus stress with a solid endpoint. The alignment of the knee is neutral. Muscle strength is normal. Quadriceps and hamstring muscle strength is normal bilaterally. Pedal pulses are palpable.  Tender to palpation over the pes anserine bursa. [de-identified] : AP, lateral, tunnel, and sunrise knee x-rays of the left knee were reviewed from the previous visit and demonstrate status post left medial unicompartmental knee arthroplasty.  The tibial component shows possible evidence of loosening with radiolucent lines.  There is a medial tibial plate noted.  The fracture appears healed.

## 2023-04-28 NOTE — DISCUSSION/SUMMARY
[de-identified] : This patient has a painful and failed left unicompartmental knee arthroplasty.  She has pes anserine bursitis.  I think that this is because of the instability from the tibial plateau piece being somewhat subsided from prior fracture.  She has failed a course of conservative management and would like to consider proceeding with a left total knee arthroplasty conversion revision and possible hardware removal.  We discussed that we may only remove the top 2 screws and possibly cut the plate if needed.  To rule out infection today we performed an aspiration of the left knee and this will be sent for cell count and culture.  I explained to the patient that the best estimate I can give her for pain improvement would be 85%.  There is no guarantee that she would have pain improvement if she has nerve injury.\par \par The patient is an appropriate candidate for consideration of left revision total knee arthroplasty and hardware removal with Tony robotic navigation assistance. This recommendation is based on the patient's pain, function, and bone stock. An extensive discussion was conducted of the natural history of this particular problem and the variety of surgical and non-surgical treatment options available to the patient. A risk/benefit analysis was discussed with the patient reviewing the advantages and disadvantages of surgical intervention at this time. A full explanation was given of the nature and the purpose of the procedure and anesthesia, its benefits, possible alternative methods of diagnosis or treatment, the risks involved, the possibility of complications, the foreseeable consequences of the procedure and the possible results of the non-treatment. I reviewed the plan of care and I also used a model of a revision joint replacement implant equivalent to the one that will be used for their revision total joint replacement. The ability to secure the implant utilizing cement or cementless (press-fit) was discussed with the patient. The patient agrees with the plan of care, as well as the use of implants for their revision joint replacement. \par \par No guarantee or assurance was made as to the results that may be obtained. Specifically, the risks were identified to include, but are not limited to the following: Infection, phlebitis, pulmonary embolism, death, paralysis, dislocation, pain, stiffness, instability, limp, weakness, breakage, leg-length inequality, uncontrolled bleeding, nerve injury, blood vessel injury, pressure sores, anesthetic risks, delayed healing of wound and bone, and wear and loosening. Further discussion was undertaken with the patient about the details of surgical preparation, treatment, and postoperative rehabilitation including medical clearance, autotransfusion, the hospital course, and the postoperative rehabilitation involved. Reimplantation may require cemented or cementless components, or both, depending upon a variety of factors that must be assessed at the time of surgery. The need for bone graft (either autograft or allograft) to enhance the chance for success of the procedure(s) was discussed. All in all, I feel that this patient is a good candidate for surgical reconstruction.\par \par The patient and I discussed the current SARS-CoV-2 (COVID-19) pandemic which has affected our local hospitals. We discussed that our hospitals treat patients with COVID-19. All efforts will be made to avoid cohorting the patient with diagnosed or suspected COVID-19 patient. They also understand that we will screen them 24-48 hours prior to surgery. Despite our best efforts, there is a potential risk for iatrogenic transmission of COVID-19 to the patient during the perioperative period. Raghav COVID-19 during the perioperative period may increase the patient´s risks of an adverse outcome including postoperative pneumonia, difficulty breathing, requirement for a breathing tube (general endotracheal intubation), and death. The patient is understanding of this risk, and is willing to proceed with surgery at this time.

## 2023-05-22 ENCOUNTER — RX RENEWAL (OUTPATIENT)
Age: 73
End: 2023-05-22

## 2023-06-21 ENCOUNTER — RX RENEWAL (OUTPATIENT)
Age: 73
End: 2023-06-21

## 2023-06-23 ENCOUNTER — RX RENEWAL (OUTPATIENT)
Age: 73
End: 2023-06-23

## 2023-06-28 ENCOUNTER — RX RENEWAL (OUTPATIENT)
Age: 73
End: 2023-06-28

## 2023-06-29 ENCOUNTER — LABORATORY RESULT (OUTPATIENT)
Age: 73
End: 2023-06-29

## 2023-06-29 ENCOUNTER — APPOINTMENT (OUTPATIENT)
Dept: FAMILY MEDICINE | Facility: CLINIC | Age: 73
End: 2023-06-29
Payer: MEDICARE

## 2023-06-29 VITALS
SYSTOLIC BLOOD PRESSURE: 119 MMHG | DIASTOLIC BLOOD PRESSURE: 74 MMHG | HEIGHT: 62 IN | BODY MASS INDEX: 30.73 KG/M2 | WEIGHT: 167 LBS | RESPIRATION RATE: 14 BRPM | OXYGEN SATURATION: 97 % | TEMPERATURE: 96 F | HEART RATE: 84 BPM

## 2023-06-29 PROCEDURE — 99214 OFFICE O/P EST MOD 30 MIN: CPT

## 2023-06-29 RX ORDER — MONTELUKAST 10 MG/1
10 TABLET, FILM COATED ORAL
Qty: 1 | Refills: 3 | Status: COMPLETED | COMMUNITY
Start: 2023-03-10 | End: 2023-06-29

## 2023-06-29 RX ORDER — AMITRIPTYLINE HYDROCHLORIDE 10 MG/1
10 TABLET, FILM COATED ORAL
Refills: 0 | Status: ACTIVE | COMMUNITY
Start: 2023-06-29

## 2023-06-29 RX ORDER — TRAMADOL HYDROCHLORIDE 50 MG/1
50 TABLET, COATED ORAL
Qty: 30 | Refills: 0 | Status: COMPLETED | COMMUNITY
Start: 2020-04-07 | End: 2023-06-29

## 2023-06-29 RX ORDER — TIOTROPIUM BROMIDE INHALATION SPRAY 3.12 UG/1
2.5 SPRAY, METERED RESPIRATORY (INHALATION)
Qty: 1 | Refills: 3 | Status: COMPLETED | COMMUNITY
Start: 2023-03-10 | End: 2023-06-29

## 2023-07-17 ENCOUNTER — OUTPATIENT (OUTPATIENT)
Dept: OUTPATIENT SERVICES | Facility: HOSPITAL | Age: 73
LOS: 1 days | End: 2023-07-17
Payer: MEDICARE

## 2023-07-17 ENCOUNTER — RESULT REVIEW (OUTPATIENT)
Age: 73
End: 2023-07-17

## 2023-07-17 VITALS
HEART RATE: 80 BPM | WEIGHT: 169.09 LBS | SYSTOLIC BLOOD PRESSURE: 158 MMHG | HEIGHT: 62 IN | OXYGEN SATURATION: 96 % | RESPIRATION RATE: 16 BRPM | DIASTOLIC BLOOD PRESSURE: 84 MMHG | TEMPERATURE: 98 F

## 2023-07-17 DIAGNOSIS — J30.2 OTHER SEASONAL ALLERGIC RHINITIS: Chronic | ICD-10-CM

## 2023-07-17 DIAGNOSIS — K21.9 GASTRO-ESOPHAGEAL REFLUX DISEASE WITHOUT ESOPHAGITIS: Chronic | ICD-10-CM

## 2023-07-17 DIAGNOSIS — M25.562 PAIN IN LEFT KNEE: Chronic | ICD-10-CM

## 2023-07-17 DIAGNOSIS — Z98.890 OTHER SPECIFIED POSTPROCEDURAL STATES: Chronic | ICD-10-CM

## 2023-07-17 DIAGNOSIS — M67.439 GANGLION, UNSPECIFIED WRIST: Chronic | ICD-10-CM

## 2023-07-17 DIAGNOSIS — H91.90 UNSPECIFIED HEARING LOSS, UNSPECIFIED EAR: Chronic | ICD-10-CM

## 2023-07-17 DIAGNOSIS — Z98.89 OTHER SPECIFIED POSTPROCEDURAL STATES: Chronic | ICD-10-CM

## 2023-07-17 DIAGNOSIS — H91.90 UNSPECIFIED HEARING LOSS, UNSPECIFIED EAR: ICD-10-CM

## 2023-07-17 DIAGNOSIS — R05.3 CHRONIC COUGH: Chronic | ICD-10-CM

## 2023-07-17 DIAGNOSIS — E32.9 DISEASE OF THYMUS, UNSPECIFIED: Chronic | ICD-10-CM

## 2023-07-17 DIAGNOSIS — Z87.828 PERSONAL HISTORY OF OTHER (HEALED) PHYSICAL INJURY AND TRAUMA: Chronic | ICD-10-CM

## 2023-07-17 DIAGNOSIS — Z01.818 ENCOUNTER FOR OTHER PREPROCEDURAL EXAMINATION: ICD-10-CM

## 2023-07-17 DIAGNOSIS — Z29.9 ENCOUNTER FOR PROPHYLACTIC MEASURES, UNSPECIFIED: ICD-10-CM

## 2023-07-17 DIAGNOSIS — Z96.652 PRESENCE OF LEFT ARTIFICIAL KNEE JOINT: Chronic | ICD-10-CM

## 2023-07-17 DIAGNOSIS — M25.562 PAIN IN LEFT KNEE: ICD-10-CM

## 2023-07-17 DIAGNOSIS — R07.9 CHEST PAIN, UNSPECIFIED: ICD-10-CM

## 2023-07-17 LAB
A1C WITH ESTIMATED AVERAGE GLUCOSE RESULT: 5.7 % — HIGH (ref 4–5.6)
ANION GAP SERPL CALC-SCNC: 15 MMOL/L — SIGNIFICANT CHANGE UP (ref 5–17)
BLD GP AB SCN SERPL QL: NEGATIVE — SIGNIFICANT CHANGE UP
BUN SERPL-MCNC: 20 MG/DL — SIGNIFICANT CHANGE UP (ref 7–23)
CALCIUM SERPL-MCNC: 9.4 MG/DL — SIGNIFICANT CHANGE UP (ref 8.4–10.5)
CHLORIDE SERPL-SCNC: 104 MMOL/L — SIGNIFICANT CHANGE UP (ref 96–108)
CO2 SERPL-SCNC: 24 MMOL/L — SIGNIFICANT CHANGE UP (ref 22–31)
CREAT SERPL-MCNC: 0.81 MG/DL — SIGNIFICANT CHANGE UP (ref 0.5–1.3)
EGFR: 76 ML/MIN/1.73M2 — SIGNIFICANT CHANGE UP
ESTIMATED AVERAGE GLUCOSE: 117 MG/DL — HIGH (ref 68–114)
GLUCOSE SERPL-MCNC: 119 MG/DL — HIGH (ref 70–99)
HCT VFR BLD CALC: 40.3 % — SIGNIFICANT CHANGE UP (ref 34.5–45)
HGB BLD-MCNC: 12.9 G/DL — SIGNIFICANT CHANGE UP (ref 11.5–15.5)
MCHC RBC-ENTMCNC: 29.1 PG — SIGNIFICANT CHANGE UP (ref 27–34)
MCHC RBC-ENTMCNC: 32 GM/DL — SIGNIFICANT CHANGE UP (ref 32–36)
MCV RBC AUTO: 91 FL — SIGNIFICANT CHANGE UP (ref 80–100)
MRSA PCR RESULT.: SIGNIFICANT CHANGE UP
NRBC # BLD: 0 /100 WBCS — SIGNIFICANT CHANGE UP (ref 0–0)
PLATELET # BLD AUTO: 322 K/UL — SIGNIFICANT CHANGE UP (ref 150–400)
POTASSIUM SERPL-MCNC: 3.7 MMOL/L — SIGNIFICANT CHANGE UP (ref 3.5–5.3)
POTASSIUM SERPL-SCNC: 3.7 MMOL/L — SIGNIFICANT CHANGE UP (ref 3.5–5.3)
RBC # BLD: 4.43 M/UL — SIGNIFICANT CHANGE UP (ref 3.8–5.2)
RBC # FLD: 13.4 % — SIGNIFICANT CHANGE UP (ref 10.3–14.5)
RH IG SCN BLD-IMP: POSITIVE — SIGNIFICANT CHANGE UP
S AUREUS DNA NOSE QL NAA+PROBE: SIGNIFICANT CHANGE UP
SODIUM SERPL-SCNC: 143 MMOL/L — SIGNIFICANT CHANGE UP (ref 135–145)
WBC # BLD: 6.2 K/UL — SIGNIFICANT CHANGE UP (ref 3.8–10.5)
WBC # FLD AUTO: 6.2 K/UL — SIGNIFICANT CHANGE UP (ref 3.8–10.5)

## 2023-07-17 PROCEDURE — 87640 STAPH A DNA AMP PROBE: CPT

## 2023-07-17 PROCEDURE — 73700 CT LOWER EXTREMITY W/O DYE: CPT | Mod: 26,LT,MD

## 2023-07-17 PROCEDURE — 36415 COLL VENOUS BLD VENIPUNCTURE: CPT

## 2023-07-17 PROCEDURE — 73700 CT LOWER EXTREMITY W/O DYE: CPT | Mod: MD

## 2023-07-17 PROCEDURE — 87641 MR-STAPH DNA AMP PROBE: CPT

## 2023-07-17 PROCEDURE — 83036 HEMOGLOBIN GLYCOSYLATED A1C: CPT

## 2023-07-17 PROCEDURE — 85027 COMPLETE CBC AUTOMATED: CPT

## 2023-07-17 PROCEDURE — G0463: CPT

## 2023-07-17 PROCEDURE — 86900 BLOOD TYPING SEROLOGIC ABO: CPT

## 2023-07-17 PROCEDURE — 86901 BLOOD TYPING SEROLOGIC RH(D): CPT

## 2023-07-17 PROCEDURE — 86850 RBC ANTIBODY SCREEN: CPT

## 2023-07-17 PROCEDURE — 80048 BASIC METABOLIC PNL TOTAL CA: CPT

## 2023-07-17 RX ORDER — CHLORHEXIDINE GLUCONATE 213 G/1000ML
1 SOLUTION TOPICAL ONCE
Refills: 0 | Status: COMPLETED | OUTPATIENT
Start: 2023-08-03 | End: 2023-08-03

## 2023-07-17 RX ORDER — SODIUM CHLORIDE 9 MG/ML
3 INJECTION INTRAMUSCULAR; INTRAVENOUS; SUBCUTANEOUS EVERY 8 HOURS
Refills: 0 | Status: DISCONTINUED | OUTPATIENT
Start: 2023-08-03 | End: 2023-08-03

## 2023-07-17 RX ORDER — GABAPENTIN 400 MG/1
2 CAPSULE ORAL
Refills: 0 | DISCHARGE

## 2023-07-17 RX ORDER — LIDOCAINE HCL 20 MG/ML
0.2 VIAL (ML) INJECTION ONCE
Refills: 0 | Status: DISCONTINUED | OUTPATIENT
Start: 2023-08-03 | End: 2023-08-07

## 2023-07-17 NOTE — H&P PST ADULT - NSICDXPASTMEDICALHX_GEN_ALL_CORE_FT
PAST MEDICAL HISTORY:  GERD (gastroesophageal reflux disease)     Hearing impairment     Hearing loss     Heart murmur     History of chronic cough     History of iron deficiency anemia     History of psoriasis     History of sciatica     HLD (hyperlipidemia)     Left knee pain     Osteoarthrosis     Seasonal allergies     Smoker within last 12 months      PAST MEDICAL HISTORY:  Former smoker     GERD (gastroesophageal reflux disease)     Hearing impairment     Hearing loss     Heart murmur     History of chronic cough     History of iron deficiency anemia     History of psoriasis     History of sciatica     HLD (hyperlipidemia)     Left knee pain     Osteoarthrosis     Seasonal allergies     Thickened endometrium

## 2023-07-17 NOTE — H&P PST ADULT - OTHER CARE PROVIDERS
cardio Dr. Juanjose Chisholm [phone] preop eval on chart 6/28/2023 cardio Dr. Juanjose Chisholm [phone] preop eval on chart 6/28/2023, pulm Dr. Newman [facility] ( allscripts ) last visit 4/2023

## 2023-07-17 NOTE — H&P PST ADULT - ASSESSMENT
Airway : no airway abnormalities , denies prior anesthesia complications   Mallampati : II  Denies loose teeth     Kristian abrasion risk : Denies     CAPRINI VTE 2.0 SCORE [CLOT updated 2019]    AGE RELATED RISK FACTORS                                                       MOBILITY RELATED FACTORS  [ ] Age 41-60 years                                            (1 Point)                    [ ] Bed rest                                                        (1 Point)  [x ] Age: 61-74 years                                           (2 Points)                  [ ] Plaster cast                                                   (2 Points)  [ ] Age= 75 years                                              (3 Points)                    [ ] Bed bound for more than 72 hours                 (2 Points)    DISEASE RELATED RISK FACTORS                                               GENDER SPECIFIC FACTORS  [ ] Edema in the lower extremities                       (1 Point)              [ ] Pregnancy                                                     (1 Point)  [ ] Varicose veins                                               (1 Point)                     [ ] Post-partum < 6 weeks                                   (1 Point)             [x ] BMI > 25 Kg/m2                                            (1 Point)                     [ ] Hormonal therapy  or oral contraception          (1 Point)                 [ ] Sepsis (in the previous month)                        (1 Point)               [ ] History of pregnancy complications                 (1 point)  [ ] Pneumonia or serious lung disease                                               [ ] Unexplained or recurrent                     (1 Point)           (in the previous month)                               (1 Point)  [ ] Abnormal pulmonary function test                     (1 Point)                 SURGERY RELATED RISK FACTORS  [ ] Acute myocardial infarction                              (1 Point)               [ ]  Section                                             (1 Point)  [ ] Congestive heart failure (in the previous month)  (1 Point)      [ ] Minor surgery                                                  (1 Point)   [ ] Inflammatory bowel disease                             (1 Point)               [ ] Arthroscopic surgery                                        (2 Points)  [ ] Central venous access                                      (2 Points)                [ ] General surgery lasting more than 45 minutes (2 points)  [ ] Malignancy- Present or previous                   (2 Points)                [ x] Elective arthroplasty                                         (5 points)    [ ] Stroke (in the previous month)                          (5 Points)                                                                                                                                                           HEMATOLOGY RELATED FACTORS                                                 TRAUMA RELATED RISK FACTORS  [ ] Prior episodes of VTE                                     (3 Points)                [ ] Fracture of the hip, pelvis, or leg                       (5 Points)  [ ] Positive family history for VTE                         (3 Points)             [ ] Acute spinal cord injury (in the previous month)  (5 Points)  [ ] Prothrombin 44991 A                                     (3 Points)               [ ] Paralysis  (less than 1 month)                             (5 Points)  [ ] Factor V Leiden                                             (3 Points)                  [ ] Multiple Trauma within 1 month                        (5 Points)  [ ] Lupus anticoagulants                                     (3 Points)                                                           [ ] Anticardiolipin antibodies                               (3 Points)                                                       [ ] High homocysteine in the blood                      (3 Points)                                             [ ] Other congenital or acquired thrombophilia      (3 Points)                                                [ ] Heparin induced thrombocytopenia                  (3 Points)                                     Total Score [     8     ]

## 2023-07-17 NOTE — H&P PST ADULT - FUNCTIONAL STATUS
DASI/4-10 METS DASI 6.55 able to walk, climb 2 flights, house work, independent. activity limited due to knee pain . Denies symptoms of SOB, CP, palpitations/4-10 METS

## 2023-07-17 NOTE — H&P PST ADULT - CONSTITUTIONAL
PT WANTS WJB TO CALL RE:  PT MISSED WORK AND WILL NEED A NOTE TO RETURN.  SINUS CONGESTION.  NO FEVER, NO COUGH.  SYMPTOMS STARTED Thursday.  USING OTC NASAL SPRAY, DRINKING FLUIDS.  PLS ADVISE PHONE VISIT OR JUST PHONE CALL.  PT STATES HE CAN'T USE PORTAL.     well-groomed/no distress

## 2023-07-17 NOTE — H&P PST ADULT - NSICDXPASTSURGICALHX_GEN_ALL_CORE_FT
PAST SURGICAL HISTORY:  Ganglion cyst of wrist removal 1990    H/O repair of left rotator cuff 2010    History of appendectomy 1969    History of arthroscopy of both knees     History of bladder repair surgery Bladder lift 12/2007    History of inguinal hernia repair     S/P trigger finger release 2005    Status post left partial knee replacement     Thymus disorder removal- 1954

## 2023-07-17 NOTE — H&P PST ADULT - HISTORY OF PRESENT ILLNESS
73 yr old F with hearing impairment  ( does not know sign language , able to communicate by lip reading ),  with history of HLD,  heart murmur ( functional ), Iron deficiency anemia , GERD, underwent partial left knee replacement 10/2020 ( [facility]) few days later sustained a medial tibial plateau fracture s/p ORIF . Patient reports worsening left knee pain 7/10,  at times difficulty walking. Presents to PST for scheduled     [place] Dr. Aren Watson     left knee partial 10/2020  73 yr old F with hearing impairment  ( does not know sign language , able to communicate by lip reading ),  with history of HLD,  heart murmur ( functional ), Iron deficiency anemia , GERD, underwent partial left knee replacement 10/2020 ( [facility]) few days later sustained a medial tibial plateau fracture s/p ORIF . Patient reports worsening left knee pain 7/10,  at times difficulty walking. Presents to PST for scheduled left total knee replacement revision with Tony robot on 8/3/2023. Patient denies fever, chills, no acute complaints. Ambulating without assistance.     *** As per patient she is scheduled for D&C @ [facility] 7/26/2023 Dr. Aren Watson due to abnormal pap smear and thickened endometrium. Denies vaginal bleeding, no acute symptoms. ***    patient had [facility] CT done today (  Medicare )    73 yr old F with hearing impairment  ( does not know sign language , able to communicate by lip reading ),  with history of HLD,  heart murmur ( functional ), Iron deficiency anemia , GERD, underwent partial left knee replacement 10/2020 ( [facility]) few days later sustained a medial tibial plateau fracture s/p ORIF . Patient reports worsening left knee pain 7/10,  at times difficulty walking. Presents to PST for scheduled left total knee replacement revision with Tony robot on 8/3/2023. Patient denies fever, chills, no acute complaints. Ambulating without assistance.

## 2023-07-17 NOTE — H&P PST ADULT - NEGATIVE CARDIOVASCULAR SYMPTOMS
Merrill Mayfield)  2018 10:50:52 no chest pain/no palpitations/no dyspnea on exertion/no peripheral edema

## 2023-07-17 NOTE — H&P PST ADULT - RS GEN HX ROS MEA POS PC
reports dry cough post Covid immunization, was evaluated by pulm, on amytriptyline with significant improvement/cough

## 2023-07-17 NOTE — H&P PST ADULT - NSANTHOSAYNRD_GEN_A_CORE
No. CEDRIC screening performed.  STOP BANG Legend: 0-2 = LOW Risk; 3-4 = INTERMEDIATE Risk; 5-8 = HIGH Risk

## 2023-07-17 NOTE — H&P PST ADULT - PRO ARRIVE FROM
[facility] Text: The defect edges were debeveled with a #15 scalpel blade.  The primary defect was closed partially with a complex linear closure.  Given the location of the defect, shape of the defect and the proximity to free margins a full thickness skin graft was deemed most appropriate to repair the remaining defect.  The graft was trimmed to fit the size of the remaining defect.  The graft was then placed in the primary defect, oriented appropriately, and sutured into place.

## 2023-07-17 NOTE — H&P PST ADULT - PROBLEM SELECTOR PLAN 1
Left total knee replacement revision with Tony robot   PST instructions provided, surgical scrub given, patient verbalized understanding   CBC, BMP, T&S, MRSA, HgA1c collected and sent   PCP evaluation, pulm and cardiac evaluation on chart , reviewed

## 2023-07-19 ENCOUNTER — APPOINTMENT (OUTPATIENT)
Dept: PULMONOLOGY | Facility: CLINIC | Age: 73
End: 2023-07-19
Payer: MEDICARE

## 2023-07-19 VITALS — HEART RATE: 82 BPM | SYSTOLIC BLOOD PRESSURE: 121 MMHG | OXYGEN SATURATION: 97 % | DIASTOLIC BLOOD PRESSURE: 79 MMHG

## 2023-07-19 DIAGNOSIS — Z01.811 ENCOUNTER FOR PREPROCEDURAL RESPIRATORY EXAMINATION: ICD-10-CM

## 2023-07-19 PROCEDURE — 95012 NITRIC OXIDE EXP GAS DETER: CPT

## 2023-07-19 PROCEDURE — 99214 OFFICE O/P EST MOD 30 MIN: CPT | Mod: 25

## 2023-07-19 PROCEDURE — 94060 EVALUATION OF WHEEZING: CPT

## 2023-07-19 RX ORDER — AZITHROMYCIN 250 MG/1
250 TABLET, FILM COATED ORAL
Qty: 1 | Refills: 0 | Status: DISCONTINUED | COMMUNITY
Start: 2023-02-03 | End: 2023-07-19

## 2023-07-19 RX ORDER — CEFUROXIME AXETIL 500 MG/1
500 TABLET ORAL
Qty: 14 | Refills: 0 | Status: DISCONTINUED | COMMUNITY
Start: 2021-12-30 | End: 2023-07-19

## 2023-07-19 RX ORDER — BENZONATATE 200 MG/1
200 CAPSULE ORAL
Qty: 30 | Refills: 1 | Status: DISCONTINUED | COMMUNITY
Start: 2018-11-01 | End: 2023-07-19

## 2023-07-19 RX ORDER — CIPROFLOXACIN HYDROCHLORIDE 500 MG/1
500 TABLET, FILM COATED ORAL
Qty: 10 | Refills: 0 | Status: DISCONTINUED | COMMUNITY
Start: 2020-11-03 | End: 2023-07-19

## 2023-07-19 RX ORDER — HYDROCODONE BITARTRATE AND HOMATROPINE METHYLBROMIDE 5; 1.5 MG/5ML; MG/5ML
5-1.5 SOLUTION ORAL EVERY 8 HOURS
Qty: 1 | Refills: 0 | Status: DISCONTINUED | COMMUNITY
Start: 2018-06-14 | End: 2023-07-19

## 2023-07-19 NOTE — RESULTS/DATA
[] : results reviewed [de-identified] : normal [de-identified] : normal [de-identified] : normal [de-identified] : cardiac clearance

## 2023-07-19 NOTE — ASSESSMENT
[Patient Optimized for Surgery] : Patient optimized for surgery [No Further Testing Recommended] : no further testing recommended [FreeTextEntry4] :  clearfed\par Had Cardiac clearance by Dr Juanjose Chisholm

## 2023-07-19 NOTE — PROCEDURE
[FreeTextEntry1] : OLIVE 7/192/3\par Normal study no BD at FEV1\par NIOX  6 ppb WNL\par \par PFT 3/10/23\par  Flow rates nl\par Lung Volumes nl\par DLCO 78 %\par HGB 12.5\par \par NIOX  17  ppb nl  range 3/10/23\par \par X-ray PA lateral March 10, 2023\par Only fair inspiratory effort\par Cardiac size grossly normal\par Lung fields are grossly clear without parenchymal infiltrates pleural effusions or dominant pulmonary nodules\par

## 2023-07-19 NOTE — RESULTS/DATA
[] : results reviewed [de-identified] : normal [de-identified] : normal [de-identified] : normal [de-identified] : cardiac clearance

## 2023-07-19 NOTE — HISTORY OF PRESENT ILLNESS
[No Pertinent Cardiac History] : no history of aortic stenosis, atrial fibrillation, coronary artery disease, recent myocardial infarction, or implantable device/pacemaker [No Pertinent Pulmonary History] : no history of asthma, COPD, sleep apnea, or smoking [No Adverse Anesthesia Reaction] : no adverse anesthesia reaction in self or family member [Diabetes] : no diabetes [FreeTextEntry1] : LTKR [FreeTextEntry2] : 8//3/2023 [FreeTextEntry3] : Dr Mikey Sanchez [FreeTextEntry4] : 74 y/o F presents for preop for LTKR. Pt  had partial replacmeent 2021\par see form

## 2023-07-19 NOTE — DISCUSSION/SUMMARY
[FreeTextEntry1] : Chronic inflammatory cough\par Pulmonary physiology normal\par Nitric oxide normal\par Chest x-ray normal\par Recommendations\par Trial of Spiriva for possible cough variant asthma 2.5 mcg 2 puffs daily with instructions OFF\par Advised most common side effect is dry mouth\par Singulair 10 mg 1 tablet p.o. daily with dinner to see if this helps overnight cough symptomatology on Hold\par Return in 3 month\par No pulmonary  contraindication for ORTHO surgery as  scheduled

## 2023-07-19 NOTE — HISTORY OF PRESENT ILLNESS
[Never] : never [TextBox_4] : 73-year-old female\par Left knee surgery pending Aug 3 2023\par cough much  better\par s/p ENT Dr Howard\par sent to  allergy\par testing neg\par pred tx and cough better\par Chief complaint chronic cough\par ? pt relates prior COVID vaccine\par Onset of cough September 2022\par She was diagnosed multiple times with bronchitis\par Treated with Z-Tom Tessalon Perles Flonase in the past has been treated with Hydromet additional antibiotics including cefuroxime\par She had several urgent care visits including primary care visit told clear lungs\par She does not carry a formal diagnosis of asthma pneumonia COPD emphysema interstitial lung disease\par She denies any difficulty swallowing\par She does have significant hearing loss\par No purulent sputum reported unclear if there is any associated wheeze with her nocturnal cough that awakens\par No history of hemoptysis\par Denies ever having a known COVID-19 infection\par COVID-vaccine up to date

## 2023-07-19 NOTE — HISTORY OF PRESENT ILLNESS
[No Pertinent Cardiac History] : no history of aortic stenosis, atrial fibrillation, coronary artery disease, recent myocardial infarction, or implantable device/pacemaker [No Pertinent Pulmonary History] : no history of asthma, COPD, sleep apnea, or smoking [No Adverse Anesthesia Reaction] : no adverse anesthesia reaction in self or family member [Diabetes] : no diabetes [FreeTextEntry1] : LTKR [FreeTextEntry2] : 8//3/2023 [FreeTextEntry4] : 72 y/o F presents for preop for LTKR. Pt  had partial replacmeent 2021\par see form [FreeTextEntry3] : Dr Mikey Sanchez

## 2023-07-20 ENCOUNTER — TRANSCRIPTION ENCOUNTER (OUTPATIENT)
Age: 73
End: 2023-07-20

## 2023-07-20 ENCOUNTER — NON-APPOINTMENT (OUTPATIENT)
Age: 73
End: 2023-07-20

## 2023-07-20 NOTE — HISTORY OF PRESENT ILLNESS
[No Pertinent Cardiac History] : no history of aortic stenosis, atrial fibrillation, coronary artery disease, recent myocardial infarction, or implantable device/pacemaker [No Pertinent Pulmonary History] : no history of asthma, COPD, sleep apnea, or smoking [No Adverse Anesthesia Reaction] : no adverse anesthesia reaction in self or family member [Chronic Anticoagulation] : no chronic anticoagulation [Chronic Kidney Disease] : no chronic kidney disease [Diabetes] : no diabetes [FreeTextEntry1] : D&C [FreeTextEntry2] : 7/26/2023 [FreeTextEntry3] : Dr Aren Watson [FreeTextEntry4] : 72 y/o F presents for preop medical clearance

## 2023-07-20 NOTE — RESULTS/DATA
[] : results reviewed [de-identified] : normal [de-identified] : normal [de-identified] : normal

## 2023-07-31 PROBLEM — H91.90 UNSPECIFIED HEARING LOSS, UNSPECIFIED EAR: Chronic | Status: ACTIVE | Noted: 2023-07-17

## 2023-07-31 PROBLEM — R01.1 CARDIAC MURMUR, UNSPECIFIED: Chronic | Status: ACTIVE | Noted: 2023-07-17

## 2023-07-31 PROBLEM — Z86.2 PERSONAL HISTORY OF DISEASES OF THE BLOOD AND BLOOD-FORMING ORGANS AND CERTAIN DISORDERS INVOLVING THE IMMUNE MECHANISM: Chronic | Status: ACTIVE | Noted: 2023-07-17

## 2023-07-31 PROBLEM — Z87.2 PERSONAL HISTORY OF DISEASES OF THE SKIN AND SUBCUTANEOUS TISSUE: Chronic | Status: ACTIVE | Noted: 2023-07-17

## 2023-07-31 PROBLEM — Z86.69 PERSONAL HISTORY OF OTHER DISEASES OF THE NERVOUS SYSTEM AND SENSE ORGANS: Chronic | Status: ACTIVE | Noted: 2023-07-17

## 2023-07-31 PROBLEM — M25.562 PAIN IN LEFT KNEE: Chronic | Status: ACTIVE | Noted: 2023-07-17

## 2023-07-31 PROBLEM — J30.2 OTHER SEASONAL ALLERGIC RHINITIS: Chronic | Status: ACTIVE | Noted: 2023-07-17

## 2023-07-31 PROBLEM — R93.89 ABNORMAL FINDINGS ON DIAGNOSTIC IMAGING OF OTHER SPECIFIED BODY STRUCTURES: Chronic | Status: ACTIVE | Noted: 2023-07-17

## 2023-07-31 PROBLEM — Z87.898 PERSONAL HISTORY OF OTHER SPECIFIED CONDITIONS: Chronic | Status: ACTIVE | Noted: 2023-07-17

## 2023-07-31 PROBLEM — Z87.891 PERSONAL HISTORY OF NICOTINE DEPENDENCE: Chronic | Status: ACTIVE | Noted: 2023-07-17

## 2023-07-31 PROBLEM — K21.9 GASTRO-ESOPHAGEAL REFLUX DISEASE WITHOUT ESOPHAGITIS: Chronic | Status: ACTIVE | Noted: 2023-07-17

## 2023-08-02 ENCOUNTER — TRANSCRIPTION ENCOUNTER (OUTPATIENT)
Age: 73
End: 2023-08-02

## 2023-08-03 ENCOUNTER — INPATIENT (INPATIENT)
Facility: HOSPITAL | Age: 73
LOS: 3 days | Discharge: HOME HEALTH | DRG: 467 | End: 2023-08-07
Attending: ORTHOPAEDIC SURGERY | Admitting: ORTHOPAEDIC SURGERY
Payer: MEDICARE

## 2023-08-03 ENCOUNTER — APPOINTMENT (OUTPATIENT)
Dept: ORTHOPEDIC SURGERY | Facility: HOSPITAL | Age: 73
End: 2023-08-03

## 2023-08-03 ENCOUNTER — TRANSCRIPTION ENCOUNTER (OUTPATIENT)
Age: 73
End: 2023-08-03

## 2023-08-03 VITALS
RESPIRATION RATE: 16 BRPM | HEART RATE: 73 BPM | SYSTOLIC BLOOD PRESSURE: 132 MMHG | OXYGEN SATURATION: 96 % | WEIGHT: 169.09 LBS | HEIGHT: 62 IN | TEMPERATURE: 97 F | DIASTOLIC BLOOD PRESSURE: 79 MMHG

## 2023-08-03 DIAGNOSIS — Z98.89 OTHER SPECIFIED POSTPROCEDURAL STATES: Chronic | ICD-10-CM

## 2023-08-03 DIAGNOSIS — M67.439 GANGLION, UNSPECIFIED WRIST: Chronic | ICD-10-CM

## 2023-08-03 DIAGNOSIS — Z96.652 PRESENCE OF LEFT ARTIFICIAL KNEE JOINT: Chronic | ICD-10-CM

## 2023-08-03 DIAGNOSIS — Z98.890 OTHER SPECIFIED POSTPROCEDURAL STATES: Chronic | ICD-10-CM

## 2023-08-03 DIAGNOSIS — R07.9 CHEST PAIN, UNSPECIFIED: ICD-10-CM

## 2023-08-03 DIAGNOSIS — E32.9 DISEASE OF THYMUS, UNSPECIFIED: Chronic | ICD-10-CM

## 2023-08-03 PROCEDURE — 20985 CPTR-ASST DIR MS PX: CPT

## 2023-08-03 PROCEDURE — 27487 REVISE/REPLACE KNEE JOINT: CPT | Mod: LT

## 2023-08-03 PROCEDURE — 73560 X-RAY EXAM OF KNEE 1 OR 2: CPT | Mod: 26,LT

## 2023-08-03 DEVICE — CEMENT SIMPLEX WITH TOBRAMYCIN: Type: IMPLANTABLE DEVICE | Status: FUNCTIONAL

## 2023-08-03 DEVICE — STEM CMNTD TRIATHLON TS 12X50MM: Type: IMPLANTABLE DEVICE | Status: FUNCTIONAL

## 2023-08-03 DEVICE — IMPLANTABLE DEVICE: Type: IMPLANTABLE DEVICE | Status: FUNCTIONAL

## 2023-08-03 DEVICE — INSERT TIB TS PLUS X3 POLY SZ 3 11MM: Type: IMPLANTABLE DEVICE | Status: FUNCTIONAL

## 2023-08-03 DEVICE — IMP PATELLA SYMMETRIC X3 29X8MM: Type: IMPLANTABLE DEVICE | Status: FUNCTIONAL

## 2023-08-03 DEVICE — MAKO BONE PIN 3.2MM X 140MM: Type: IMPLANTABLE DEVICE | Status: FUNCTIONAL

## 2023-08-03 DEVICE — BASEPLATE TIB UNIV TRIATHLON SZ 3: Type: IMPLANTABLE DEVICE | Status: FUNCTIONAL

## 2023-08-03 DEVICE — MAKO BONE PIN 3.2MM X 110MM: Type: IMPLANTABLE DEVICE | Status: FUNCTIONAL

## 2023-08-03 RX ORDER — ACETAMINOPHEN 500 MG
1000 TABLET ORAL ONCE
Refills: 0 | Status: COMPLETED | OUTPATIENT
Start: 2023-08-04 | End: 2023-08-04

## 2023-08-03 RX ORDER — ACETAMINOPHEN 500 MG
975 TABLET ORAL EVERY 8 HOURS
Refills: 0 | Status: DISCONTINUED | OUTPATIENT
Start: 2023-08-05 | End: 2023-08-07

## 2023-08-03 RX ORDER — BECLOMETHASONE DIPROPIONATE 40 UG/1
2 AEROSOL, METERED RESPIRATORY (INHALATION)
Refills: 0 | DISCHARGE

## 2023-08-03 RX ORDER — AMITRIPTYLINE HCL 25 MG
2 TABLET ORAL
Refills: 0 | DISCHARGE

## 2023-08-03 RX ORDER — TRAMADOL HYDROCHLORIDE 50 MG/1
25 TABLET ORAL EVERY 6 HOURS
Refills: 0 | Status: DISCONTINUED | OUTPATIENT
Start: 2023-08-03 | End: 2023-08-07

## 2023-08-03 RX ORDER — CEFAZOLIN SODIUM 1 G
2000 VIAL (EA) INJECTION EVERY 8 HOURS
Refills: 0 | Status: COMPLETED | OUTPATIENT
Start: 2023-08-03 | End: 2023-08-04

## 2023-08-03 RX ORDER — ACETAMINOPHEN 500 MG
975 TABLET ORAL EVERY 8 HOURS
Refills: 0 | Status: DISCONTINUED | OUTPATIENT
Start: 2023-08-03 | End: 2023-08-03

## 2023-08-03 RX ORDER — CELECOXIB 200 MG/1
200 CAPSULE ORAL EVERY 12 HOURS
Refills: 0 | Status: DISCONTINUED | OUTPATIENT
Start: 2023-08-03 | End: 2023-08-03

## 2023-08-03 RX ORDER — SODIUM CHLORIDE 9 MG/ML
1000 INJECTION, SOLUTION INTRAVENOUS
Refills: 0 | Status: DISCONTINUED | OUTPATIENT
Start: 2023-08-03 | End: 2023-08-03

## 2023-08-03 RX ORDER — SODIUM CHLORIDE 9 MG/ML
500 INJECTION INTRAMUSCULAR; INTRAVENOUS; SUBCUTANEOUS ONCE
Refills: 0 | Status: COMPLETED | OUTPATIENT
Start: 2023-08-03 | End: 2023-08-04

## 2023-08-03 RX ORDER — TRAMADOL HYDROCHLORIDE 50 MG/1
100 TABLET ORAL EVERY 6 HOURS
Refills: 0 | Status: DISCONTINUED | OUTPATIENT
Start: 2023-08-03 | End: 2023-08-07

## 2023-08-03 RX ORDER — APIXABAN 2.5 MG/1
2.5 TABLET, FILM COATED ORAL
Refills: 0 | Status: DISCONTINUED | OUTPATIENT
Start: 2023-08-03 | End: 2023-08-07

## 2023-08-03 RX ORDER — GABAPENTIN 400 MG/1
1200 CAPSULE ORAL DAILY
Refills: 0 | Status: DISCONTINUED | OUTPATIENT
Start: 2023-08-03 | End: 2023-08-07

## 2023-08-03 RX ORDER — KETOROLAC TROMETHAMINE 30 MG/ML
15 SYRINGE (ML) INJECTION EVERY 6 HOURS
Refills: 0 | Status: DISCONTINUED | OUTPATIENT
Start: 2023-08-03 | End: 2023-08-04

## 2023-08-03 RX ORDER — PANTOPRAZOLE SODIUM 20 MG/1
40 TABLET, DELAYED RELEASE ORAL
Refills: 0 | Status: DISCONTINUED | OUTPATIENT
Start: 2023-08-03 | End: 2023-08-07

## 2023-08-03 RX ORDER — FERROUS SULFATE 325(65) MG
0 TABLET ORAL
Refills: 0 | DISCHARGE

## 2023-08-03 RX ORDER — CELECOXIB 200 MG/1
200 CAPSULE ORAL EVERY 12 HOURS
Refills: 0 | Status: DISCONTINUED | OUTPATIENT
Start: 2023-08-04 | End: 2023-08-07

## 2023-08-03 RX ORDER — MAGNESIUM HYDROXIDE 400 MG/1
30 TABLET, CHEWABLE ORAL DAILY
Refills: 0 | Status: DISCONTINUED | OUTPATIENT
Start: 2023-08-03 | End: 2023-08-07

## 2023-08-03 RX ORDER — TROSPIUM CHLORIDE 20 MG/1
1 TABLET, FILM COATED ORAL
Refills: 0 | DISCHARGE

## 2023-08-03 RX ORDER — MOMETASONE FUROATE 220 UG/1
2 INHALANT RESPIRATORY (INHALATION)
Refills: 0 | Status: DISCONTINUED | OUTPATIENT
Start: 2023-08-03 | End: 2023-08-07

## 2023-08-03 RX ORDER — ONDANSETRON 8 MG/1
4 TABLET, FILM COATED ORAL EVERY 6 HOURS
Refills: 0 | Status: DISCONTINUED | OUTPATIENT
Start: 2023-08-03 | End: 2023-08-07

## 2023-08-03 RX ORDER — FERROUS SULFATE 325(65) MG
325 TABLET ORAL DAILY
Refills: 0 | Status: DISCONTINUED | OUTPATIENT
Start: 2023-08-03 | End: 2023-08-07

## 2023-08-03 RX ORDER — ONDANSETRON 8 MG/1
4 TABLET, FILM COATED ORAL ONCE
Refills: 0 | Status: DISCONTINUED | OUTPATIENT
Start: 2023-08-03 | End: 2023-08-03

## 2023-08-03 RX ORDER — CEFAZOLIN SODIUM 1 G
2000 VIAL (EA) INJECTION ONCE
Refills: 0 | Status: COMPLETED | OUTPATIENT
Start: 2023-08-03 | End: 2023-08-03

## 2023-08-03 RX ORDER — ATORVASTATIN CALCIUM 80 MG/1
20 TABLET, FILM COATED ORAL AT BEDTIME
Refills: 0 | Status: DISCONTINUED | OUTPATIENT
Start: 2023-08-03 | End: 2023-08-07

## 2023-08-03 RX ORDER — DEXAMETHASONE 0.5 MG/5ML
8 ELIXIR ORAL ONCE
Refills: 0 | Status: COMPLETED | OUTPATIENT
Start: 2023-08-04 | End: 2023-08-04

## 2023-08-03 RX ORDER — AMITRIPTYLINE HCL 25 MG
20 TABLET ORAL AT BEDTIME
Refills: 0 | Status: DISCONTINUED | OUTPATIENT
Start: 2023-08-03 | End: 2023-08-07

## 2023-08-03 RX ORDER — MONTELUKAST 4 MG/1
1 TABLET, CHEWABLE ORAL
Refills: 0 | DISCHARGE

## 2023-08-03 RX ORDER — SODIUM CHLORIDE 9 MG/ML
500 INJECTION INTRAMUSCULAR; INTRAVENOUS; SUBCUTANEOUS ONCE
Refills: 0 | Status: COMPLETED | OUTPATIENT
Start: 2023-08-03 | End: 2023-08-03

## 2023-08-03 RX ORDER — POLYETHYLENE GLYCOL 3350 17 G/17G
17 POWDER, FOR SOLUTION ORAL AT BEDTIME
Refills: 0 | Status: DISCONTINUED | OUTPATIENT
Start: 2023-08-03 | End: 2023-08-07

## 2023-08-03 RX ORDER — SENNA PLUS 8.6 MG/1
2 TABLET ORAL AT BEDTIME
Refills: 0 | Status: DISCONTINUED | OUTPATIENT
Start: 2023-08-03 | End: 2023-08-07

## 2023-08-03 RX ORDER — MONTELUKAST 4 MG/1
10 TABLET, CHEWABLE ORAL DAILY
Refills: 0 | Status: DISCONTINUED | OUTPATIENT
Start: 2023-08-03 | End: 2023-08-07

## 2023-08-03 RX ORDER — TRAMADOL HYDROCHLORIDE 50 MG/1
50 TABLET ORAL EVERY 6 HOURS
Refills: 0 | Status: DISCONTINUED | OUTPATIENT
Start: 2023-08-03 | End: 2023-08-07

## 2023-08-03 RX ORDER — OXYBUTYNIN CHLORIDE 5 MG
5 TABLET ORAL
Refills: 0 | Status: DISCONTINUED | OUTPATIENT
Start: 2023-08-03 | End: 2023-08-07

## 2023-08-03 RX ORDER — PANTOPRAZOLE SODIUM 20 MG/1
40 TABLET, DELAYED RELEASE ORAL ONCE
Refills: 0 | Status: COMPLETED | OUTPATIENT
Start: 2023-08-03 | End: 2023-08-03

## 2023-08-03 RX ADMIN — PANTOPRAZOLE SODIUM 40 MILLIGRAM(S): 20 TABLET, DELAYED RELEASE ORAL at 14:30

## 2023-08-03 RX ADMIN — SODIUM CHLORIDE 500 MILLILITER(S): 9 INJECTION INTRAMUSCULAR; INTRAVENOUS; SUBCUTANEOUS at 23:55

## 2023-08-03 RX ADMIN — SODIUM CHLORIDE 500 MILLILITER(S): 9 INJECTION INTRAMUSCULAR; INTRAVENOUS; SUBCUTANEOUS at 22:09

## 2023-08-03 RX ADMIN — ATORVASTATIN CALCIUM 20 MILLIGRAM(S): 80 TABLET, FILM COATED ORAL at 23:56

## 2023-08-03 RX ADMIN — CHLORHEXIDINE GLUCONATE 1 APPLICATION(S): 213 SOLUTION TOPICAL at 15:21

## 2023-08-03 RX ADMIN — SODIUM CHLORIDE 75 MILLILITER(S): 9 INJECTION, SOLUTION INTRAVENOUS at 22:10

## 2023-08-03 NOTE — BRIEF OPERATIVE NOTE - OPERATION/FINDINGS
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 Removal of tibial screws from prior plateau fracture fixation. Placement of incisional vac. 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 Removal of 3 proximal tibial screws from prior plateau fracture fixation. Placement of incisional vac. 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 Removal of 3 proximal tibial screws from prior plateau fracture fixation. Placement of incisional vac and kylie drain.

## 2023-08-03 NOTE — PATIENT PROFILE ADULT - FALL HARM RISK - UNIVERSAL INTERVENTIONS
Bed in lowest position, wheels locked, appropriate side rails in place/Call bell, personal items and telephone in reach/Instruct patient to call for assistance before getting out of bed or chair/Non-slip footwear when patient is out of bed/[place] to call system/Physically safe environment - no spills, clutter or unnecessary equipment/Purposeful Proactive Rounding/Room/bathroom lighting operational, light cord in reach

## 2023-08-03 NOTE — CHART NOTE - NSCHARTNOTEFT_GEN_A_CORE
Patient seen and evaluated in the recovery unit, accompanied by her spouse at bedside. Resting without complaints at this time. Pt states pain is well tolerated. No Chest Pain, SOB, N/V/D/HA.  Of note, pt is deaf but does not use American Sign Language for communication nor is she mute. Patient able to read lips and answer all questions verbally.     T(C): 36.2 (08-03-23 @ 21:16), Max: 36.3 (08-03-23 @ 21:15)  HR: 86 (08-03-23 @ 22:15) (73 - 97)  BP: 114/56 (08-03-23 @ 22:15) (106/57 - 139/60)  RR: 16 (08-03-23 @ 22:15) (16 - 16)  SpO2: 97% (08-03-23 @ 22:15) (96% - 99%)  Wt(kg): --    Exam:  Alert and Oriented, No Acute Distress.   Card: +S1/S2, RRR  Pulm: CTAB  Laterality: L lower extremity   Bulky tiwari knee immobilizer in place   ACE dressing on L Knee clean, dry and intact.   JAMEEL drain (x1, holding suction) draining serosanguinous blood.   Prevena (x1) intact   Sensory: decreased sensation to bilateral feet at this time 2/2 spinal block still in effect   Motor: (+) weak DF/PF/EHL/FHL 2/2 at this time 2/2 spinal block still in effect   Calves soft, non-tender   2+ DP/PT pulse  Lower extremities warm and well-perfused, cap refill < 3 sec.     Xray:   IMPRESSION: s/p Left robotic assisted revision total knee arthroplasty for failed unilateral knee arthroplasty.  Hardware aligned and symmetrical within surrounding cortex. No signs of periprosthetic changes or fractures. Will review official read when available.    Labs:    A/P: 73yFemale s/p Left robotic assisted revision total knee arthroplasty for failed unilateral knee arthroplasty. VSS. NAD.  -PT/OT- WBAT in [facility] until POD3/OOB with Knee Immobilizer until POD3  -IS bedside   -DVT PPx: Eliquis 2.5 mg BID, SCD, Early OOB and Amb  -GI PPx: Protonix 40 mg   -Pain Control  -Continue Current Tx  -f/u AM labs, OR Cx  -Staples, Prevena until POD6  -Dispo planning: PACU to Floor, pending PT/OT eval.     Agusto Willoughby PA-C  Orthopedic Surgery Team  Team Pager #[phone]

## 2023-08-03 NOTE — PACU DISCHARGE NOTE - NS MD DISCHARGE NOTE DISCHARGE
Floor Burow's Advancement Flap Text: The defect edges were debeveled with a #15 scalpel blade.  Given the location of the defect and the proximity to free margins a Burow's advancement flap was deemed most appropriate.  Using a sterile surgical marker, the appropriate advancement flap was drawn incorporating the defect and placing the expected incisions within the relaxed skin tension lines where possible.    The area thus outlined was incised deep to adipose tissue with a #15 scalpel blade.  The skin margins were undermined to an appropriate distance in all directions utilizing iris scissors.

## 2023-08-03 NOTE — BRIEF OPERATIVE NOTE - NSICDXBRIEFOPLAUNCH_GEN_ALL_CORE
Lab Results   Component Value Date    EGFR 59 09/19/2022    EGFR 74 09/17/2022    EGFR 61 09/16/2022    CREATININE 0 94 09/19/2022    CREATININE 0 78 09/17/2022    CREATININE 0 92 09/16/2022     · Creatinine stable at baseline  · Avoid hypotension and nephrotoxic agents  · Monitor BMP as needed
· Chronically requires 2L NC  · No acute exacerbation  · Continue pre-hospital inhalers  · Monitor respiratory status
· Continue statin therapy
· Mood stable  · Continue xanax as needed
· Stable, continue statin therapy
<--- Click to Launch ICDx for PreOp, PostOp and Procedure

## 2023-08-03 NOTE — BRIEF OPERATIVE NOTE - NSICDXBRIEFPOSTOP_GEN_ALL_CORE_FT
Is This A New Presentation, Or A Follow-Up?: Skin Lesions How Severe Is Your Skin Lesion?: mild POST-OP DIAGNOSIS:  Prosthetic knee implant failure 03-Aug-2023 20:54:41  Naomi Diaz   Have Your Skin Lesions Been Treated?: not been treated

## 2023-08-03 NOTE — PRE-OP CHECKLIST - 2.
Neris Mcpherson visited patient to offer services. Patient and  declined  services. Patient and  were able to communicate effectively without  services; answered all questions appropriately.

## 2023-08-04 DIAGNOSIS — E78.5 HYPERLIPIDEMIA, UNSPECIFIED: ICD-10-CM

## 2023-08-04 DIAGNOSIS — K21.9 GASTRO-ESOPHAGEAL REFLUX DISEASE WITHOUT ESOPHAGITIS: ICD-10-CM

## 2023-08-04 DIAGNOSIS — R05.3 CHRONIC COUGH: ICD-10-CM

## 2023-08-04 LAB
ANION GAP SERPL CALC-SCNC: 14 MMOL/L — SIGNIFICANT CHANGE UP (ref 5–17)
BUN SERPL-MCNC: 17 MG/DL — SIGNIFICANT CHANGE UP (ref 7–23)
CALCIUM SERPL-MCNC: 8.8 MG/DL — SIGNIFICANT CHANGE UP (ref 8.4–10.5)
CHLORIDE SERPL-SCNC: 106 MMOL/L — SIGNIFICANT CHANGE UP (ref 96–108)
CO2 SERPL-SCNC: 21 MMOL/L — LOW (ref 22–31)
CREAT SERPL-MCNC: 0.85 MG/DL — SIGNIFICANT CHANGE UP (ref 0.5–1.3)
EGFR: 72 ML/MIN/1.73M2 — SIGNIFICANT CHANGE UP
GLUCOSE SERPL-MCNC: 192 MG/DL — HIGH (ref 70–99)
GRAM STN FLD: SIGNIFICANT CHANGE UP
GRAM STN FLD: SIGNIFICANT CHANGE UP
HCT VFR BLD CALC: 40.1 % — SIGNIFICANT CHANGE UP (ref 34.5–45)
HGB BLD-MCNC: 12.9 G/DL — SIGNIFICANT CHANGE UP (ref 11.5–15.5)
MCHC RBC-ENTMCNC: 29.7 PG — SIGNIFICANT CHANGE UP (ref 27–34)
MCHC RBC-ENTMCNC: 32.2 GM/DL — SIGNIFICANT CHANGE UP (ref 32–36)
MCV RBC AUTO: 92.2 FL — SIGNIFICANT CHANGE UP (ref 80–100)
NIGHT BLUE STAIN TISS: SIGNIFICANT CHANGE UP
NRBC # BLD: 0 /100 WBCS — SIGNIFICANT CHANGE UP (ref 0–0)
PLATELET # BLD AUTO: 297 K/UL — SIGNIFICANT CHANGE UP (ref 150–400)
POTASSIUM SERPL-MCNC: 4.1 MMOL/L — SIGNIFICANT CHANGE UP (ref 3.5–5.3)
POTASSIUM SERPL-SCNC: 4.1 MMOL/L — SIGNIFICANT CHANGE UP (ref 3.5–5.3)
RBC # BLD: 4.35 M/UL — SIGNIFICANT CHANGE UP (ref 3.8–5.2)
RBC # FLD: 12.9 % — SIGNIFICANT CHANGE UP (ref 10.3–14.5)
SODIUM SERPL-SCNC: 141 MMOL/L — SIGNIFICANT CHANGE UP (ref 135–145)
SPECIMEN SOURCE: SIGNIFICANT CHANGE UP
WBC # BLD: 12.36 K/UL — HIGH (ref 3.8–10.5)
WBC # FLD AUTO: 12.36 K/UL — HIGH (ref 3.8–10.5)

## 2023-08-04 PROCEDURE — 99223 1ST HOSP IP/OBS HIGH 75: CPT

## 2023-08-04 RX ORDER — DIPHENHYDRAMINE HCL 50 MG
25 CAPSULE ORAL ONCE
Refills: 0 | Status: DISCONTINUED | OUTPATIENT
Start: 2023-08-04 | End: 2023-08-07

## 2023-08-04 RX ADMIN — Medication 15 MILLIGRAM(S): at 12:22

## 2023-08-04 RX ADMIN — CELECOXIB 200 MILLIGRAM(S): 200 CAPSULE ORAL at 18:27

## 2023-08-04 RX ADMIN — TRAMADOL HYDROCHLORIDE 100 MILLIGRAM(S): 50 TABLET ORAL at 07:10

## 2023-08-04 RX ADMIN — SODIUM CHLORIDE 500 MILLILITER(S): 9 INJECTION INTRAMUSCULAR; INTRAVENOUS; SUBCUTANEOUS at 06:12

## 2023-08-04 RX ADMIN — Medication 1000 MILLIGRAM(S): at 18:13

## 2023-08-04 RX ADMIN — Medication 20 MILLIGRAM(S): at 01:58

## 2023-08-04 RX ADMIN — APIXABAN 2.5 MILLIGRAM(S): 2.5 TABLET, FILM COATED ORAL at 17:57

## 2023-08-04 RX ADMIN — APIXABAN 2.5 MILLIGRAM(S): 2.5 TABLET, FILM COATED ORAL at 06:02

## 2023-08-04 RX ADMIN — TRAMADOL HYDROCHLORIDE 50 MILLIGRAM(S): 50 TABLET ORAL at 01:14

## 2023-08-04 RX ADMIN — Medication 5 MILLIGRAM(S): at 06:02

## 2023-08-04 RX ADMIN — PANTOPRAZOLE SODIUM 40 MILLIGRAM(S): 20 TABLET, DELAYED RELEASE ORAL at 06:02

## 2023-08-04 RX ADMIN — Medication 400 MILLIGRAM(S): at 17:58

## 2023-08-04 RX ADMIN — Medication 75 MILLIGRAM(S): at 17:57

## 2023-08-04 RX ADMIN — Medication 75 MILLIGRAM(S): at 06:02

## 2023-08-04 RX ADMIN — Medication 400 MILLIGRAM(S): at 01:39

## 2023-08-04 RX ADMIN — Medication 15 MILLIGRAM(S): at 12:07

## 2023-08-04 RX ADMIN — TRAMADOL HYDROCHLORIDE 50 MILLIGRAM(S): 50 TABLET ORAL at 00:14

## 2023-08-04 RX ADMIN — Medication 5 MILLIGRAM(S): at 17:57

## 2023-08-04 RX ADMIN — Medication 1000 MILLIGRAM(S): at 02:39

## 2023-08-04 RX ADMIN — Medication 20 MILLIGRAM(S): at 22:41

## 2023-08-04 RX ADMIN — CELECOXIB 200 MILLIGRAM(S): 200 CAPSULE ORAL at 17:57

## 2023-08-04 RX ADMIN — POLYETHYLENE GLYCOL 3350 17 GRAM(S): 17 POWDER, FOR SOLUTION ORAL at 22:40

## 2023-08-04 RX ADMIN — SENNA PLUS 2 TABLET(S): 8.6 TABLET ORAL at 22:40

## 2023-08-04 RX ADMIN — Medication 100 MILLIGRAM(S): at 00:07

## 2023-08-04 RX ADMIN — Medication 400 MILLIGRAM(S): at 10:00

## 2023-08-04 RX ADMIN — Medication 1000 MILLIGRAM(S): at 10:15

## 2023-08-04 RX ADMIN — Medication 100 MILLIGRAM(S): at 07:58

## 2023-08-04 RX ADMIN — Medication 325 MILLIGRAM(S): at 12:07

## 2023-08-04 RX ADMIN — GABAPENTIN 1200 MILLIGRAM(S): 400 CAPSULE ORAL at 12:07

## 2023-08-04 RX ADMIN — ATORVASTATIN CALCIUM 20 MILLIGRAM(S): 80 TABLET, FILM COATED ORAL at 22:41

## 2023-08-04 RX ADMIN — TRAMADOL HYDROCHLORIDE 100 MILLIGRAM(S): 50 TABLET ORAL at 06:10

## 2023-08-04 RX ADMIN — MONTELUKAST 10 MILLIGRAM(S): 4 TABLET, CHEWABLE ORAL at 12:07

## 2023-08-04 RX ADMIN — Medication 1 TABLET(S): at 12:06

## 2023-08-04 RX ADMIN — Medication 101.6 MILLIGRAM(S): at 06:03

## 2023-08-04 NOTE — CONSULT NOTE ADULT - PROBLEM SELECTOR PROBLEM 2
patient now c/o left arm numbness and bilateral lower extremity numbness, able to move all extremities without difficulty, md joseph made aware. stat repeat ekg called for, normal sinus per monitor, respirations are even and unlabored, vss. will ctm closely and reassess. Chronic cough

## 2023-08-04 NOTE — PHYSICAL THERAPY INITIAL EVALUATION ADULT - ADDITIONAL COMMENTS
Pt lives in a house with 2+5+5 steps to enter and lives with . Patient was independent with all ADLs and IADLs prior to admission.

## 2023-08-04 NOTE — CONSULT NOTE ADULT - SUBJECTIVE AND OBJECTIVE BOX
MEDICINE ATTENDING INITIAL CONSULT NOTE    HPI:  73 yr old F with hearing impairment  with history of HLD,  heart murmur ( functional ), Iron deficiency anemia , GERD, underwent partial left knee replacement 10/2020 ( HSS) few days later sustained a medial tibial plateau fracture s/p ORIF . Patient reports worsening left knee pain 7/10,  at times difficulty walking. Presents to PST for scheduled left total knee replacement revision with Tony robot on 8/3/2023. Patient denies fever, chills, no acute complaints. Ambulating without assistance. (17 Jul 2023 10:37)      SUBJECTIVE:     Home Medications:  amitriptyline 10 mg oral tablet: 2 orally once a day (at bedtime) cough (03 Aug 2023 13:47)  atorvastatin 20 mg oral tablet: 1 milligram(s) orally once a day (at bedtime) (03 Aug 2023 13:47)  Caltrate 600 + D oral tablet: 1 orally once a day (03 Aug 2023 13:47)  ferrous sulfate 324 mg (65 mg elemental iron) oral tablet: orally once a day (03 Aug 2023 13:47)  Gralise: 1200 milligram(s) orally once a day (03 Aug 2023 13:47)  lutein 10 mg oral tablet: 4 orally once a day (03 Aug 2023 13:47)  lyrica 10 mg BID:  (03 Aug 2023 14:32)  montelukast 10 mg oral tablet: 1 tab(s) orally once a day (03 Aug 2023 22:18)  Omega-3 1000 mg oral capsule: 1 orally once a day (03 Aug 2023 13:47)  pantoprazole 40 mg intravenous injection: 40 intravenously once a day (03 Aug 2023 13:47)  Qvar Redihaler 80 mcg/inh inhalation aerosol: 2 puff(s) inhaled 2 times a day as needed for  cough (03 Aug 2023 22:17)  trospium 60 mg oral capsule, extended release: 1 orally once a day (03 Aug 2023 13:47)  Vitamin B-50 oral tablet: 1 orally once a day (03 Aug 2023 13:47)  Vitamin B12 100 mcg oral tablet: 1 orally once a day only Monday - Friday (03 Aug 2023 13:47)  Vitamin D3 125 mcg (5000 intl units) oral tablet: 1 orally once a day (03 Aug 2023 13:47)  zinc (as acetate) 50 mg oral capsule: 1 orally once a day (03 Aug 2023 13:47)      PAST MEDICAL & SURGICAL HISTORY:  HLD (hyperlipidemia)      Osteoarthrosis      Hearing loss      Heart murmur      Left knee pain      Hearing impairment      Seasonal allergies      GERD (gastroesophageal reflux disease)      History of psoriasis      History of iron deficiency anemia      History of sciatica      History of chronic cough      Former smoker      Thickened endometrium      History of appendectomy  1969      Thymus disorder  removal- 1954      Ganglion cyst of wrist  removal 1990      S/P trigger finger release  2005      History of bladder repair surgery  Bladder lift 12/2007      H/O repair of left rotator cuff  2010      Status post left partial knee replacement      History of arthroscopy of both knees      History of inguinal hernia repair          Review of Systems:   CONSTITUTIONAL: No fever, weight loss, or fatigue  EYES: No eye pain, visual disturbances, or discharge  ENMT:  No difficulty hearing, tinnitus, vertigo; No sinus or throat pain  NECK: No pain or stiffness  RESPIRATORY: No cough, wheezing, chills or hemoptysis; No shortness of breath  CARDIOVASCULAR: No chest pain, palpitations, dizziness, or leg swelling  GASTROINTESTINAL: No abdominal or epigastric pain. No nausea, vomiting, or hematemesis; No diarrhea or constipation. No melena or hematochezia.  GENITOURINARY: No dysuria, frequency, hematuria, or incontinence  NEUROLOGICAL: No headaches, memory loss, loss of strength, numbness, or tremors  SKIN: No itching, burning, rashes, or lesions   LYMPH NODES: No enlarged glands  ENDOCRINE: No heat or cold intolerance; No hair loss  MUSCULOSKELETAL: No joint pain or swelling; No muscle, back, or extremity pain  PSYCHIATRIC: No depression, anxiety, mood swings, or difficulty sleeping      Allergies    Pseudogest-DM (Pruritus)  percocet (Rash)  Vicodin (Rash)  oxycodone (Unknown)    Intolerances    tramadol (Pruritus)      Social History:     FAMILY HISTORY:  No pertinent family history in first degree relatives        Vital Signs Last 24 Hrs  T(C): 36.6 (04 Aug 2023 05:18), Max: 36.8 (04 Aug 2023 01:26)  T(F): 97.9 (04 Aug 2023 05:18), Max: 98.2 (04 Aug 2023 01:26)  HR: 90 (04 Aug 2023 05:18) (73 - 97)  BP: 111/70 (04 Aug 2023 05:18) (104/68 - 139/60)  BP(mean): 78 (03 Aug 2023 23:00) (74 - 97)  RR: 18 (04 Aug 2023 05:18) (16 - 18)  SpO2: 95% (04 Aug 2023 05:18) (94% - 99%)    Parameters below as of 04 Aug 2023 05:18  Patient On (Oxygen Delivery Method): room air      CAPILLARY BLOOD GLUCOSE      POCT Blood Glucose.: 103 mg/dL (03 Aug 2023 12:53)      PHYSICAL EXAM:  GENERAL: NAD, well-developed  HEAD:  NCAT  EYES: EOMI  NECK: Supple, No JVD  CHEST/LUNG: Clear to auscultation bilaterally; No wheeze  HEART: Reg rate. No M/R/G.  ABDOMEN: Soft, NT/ND, Bowel sounds present  EXTREMITIES:  2+ Peripheral Pulses, No clubbing, cyanosis, or edema  PSYCH: AAOx3  NEUROLOGY: non-focal  SKIN: No rashes or lesions    LABS:                    Culture - Joint Fluid (collected 03 Aug 2023 22:39)  Source: Knee left knee synovial fluid  Gram Stain (04 Aug 2023 06:24):    No polymorphonuclear cells seen per low power field    No organisms seen per oil power field    Culture - Tissue with Gram Stain (collected 03 Aug 2023 22:39)  Source: .Tissue Other  Gram Stain (04 Aug 2023 06:49):    Rare polymorphonuclear leukocytes seen per low power field    No organisms seen per oil power field    Culture - Tissue with Gram Stain (collected 03 Aug 2023 22:39)  Source: .Tissue Other  Gram Stain (04 Aug 2023 06:25):    No polymorphonuclear cells seen per low power field    No organisms seen per oil power field        MEDICATIONS  (STANDING):  acetaminophen   IVPB .. 1000 milliGRAM(s) IV Intermittent once  acetaminophen   IVPB .. 1000 milliGRAM(s) IV Intermittent once  amitriptyline 20 milliGRAM(s) Oral at bedtime  apixaban 2.5 milliGRAM(s) Oral two times a day  atorvastatin 20 milliGRAM(s) Oral at bedtime  ceFAZolin   IVPB 2000 milliGRAM(s) IV Intermittent every 8 hours  celecoxib 200 milliGRAM(s) Oral every 12 hours  ferrous    sulfate 325 milliGRAM(s) Oral daily  gabapentin 1200 milliGRAM(s) Oral daily  ketorolac   Injectable 15 milliGRAM(s) IV Push every 6 hours  lidocaine 1% Injectable 0.2 milliLiter(s) Local Injection once  montelukast 10 milliGRAM(s) Oral daily  multivitamin 1 Tablet(s) Oral daily  oxybutynin 5 milliGRAM(s) Oral two times a day  pantoprazole    Tablet 40 milliGRAM(s) Oral before breakfast  polyethylene glycol 3350 17 Gram(s) Oral at bedtime  pregabalin 75 milliGRAM(s) Oral two times a day  senna 2 Tablet(s) Oral at bedtime    MEDICATIONS  (PRN):  diphenhydrAMINE Injectable 25 milliGRAM(s) IV Push once PRN Allergy symptoms  magnesium hydroxide Suspension 30 milliLiter(s) Oral daily PRN Constipation  mometasone 220 MICROgram(s) Inhaler 2 Puff(s) Inhalation two times a day PRN cough  ondansetron Injectable 4 milliGRAM(s) IV Push every 6 hours PRN Nausea and/or Vomiting  traMADol 50 milliGRAM(s) Oral every 6 hours PRN Moderate Pain (4 - 6)  traMADol 25 milliGRAM(s) Oral every 6 hours PRN Mild Pain (1 - 3)  traMADol 100 milliGRAM(s) Oral every 6 hours PRN Severe Pain (7 - 10)     MEDICINE ATTENDING INITIAL CONSULT NOTE    HPI:  73 yr old F with hearing impairment  with history of HLD,  heart murmur ( functional ), Iron deficiency anemia , GERD, underwent partial left knee replacement 10/2020 ( HSS) few days later sustained a medial tibial plateau fracture s/p ORIF . Patient reports worsening left knee pain 7/10,  at times difficulty walking. Presents to PST for scheduled left total knee replacement revision with Tony robot on 8/3/2023. Patient denies fever, chills, no acute complaints. Ambulating without assistance. (17 Jul 2023 10:37)      SUBJECTIVE: Seen at bedside with . Did not sleep last night. Pain better controlled this am.    Home Medications:  amitriptyline 10 mg oral tablet: 2 orally once a day (at bedtime) cough (03 Aug 2023 13:47)  atorvastatin 20 mg oral tablet: 1 milligram(s) orally once a day (at bedtime) (03 Aug 2023 13:47)  Caltrate 600 + D oral tablet: 1 orally once a day (03 Aug 2023 13:47)  ferrous sulfate 324 mg (65 mg elemental iron) oral tablet: orally once a day (03 Aug 2023 13:47)  Gralise: 1200 milligram(s) orally once a day (03 Aug 2023 13:47)  lutein 10 mg oral tablet: 4 orally once a day (03 Aug 2023 13:47)  lyrica 10 mg BID:  (03 Aug 2023 14:32)  montelukast 10 mg oral tablet: 1 tab(s) orally once a day (03 Aug 2023 22:18)  Omega-3 1000 mg oral capsule: 1 orally once a day (03 Aug 2023 13:47)  pantoprazole 40 mg intravenous injection: 40 intravenously once a day (03 Aug 2023 13:47)  Qvar Redihaler 80 mcg/inh inhalation aerosol: 2 puff(s) inhaled 2 times a day as needed for  cough (03 Aug 2023 22:17)  trospium 60 mg oral capsule, extended release: 1 orally once a day (03 Aug 2023 13:47)  Vitamin B-50 oral tablet: 1 orally once a day (03 Aug 2023 13:47)  Vitamin B12 100 mcg oral tablet: 1 orally once a day only Monday - Friday (03 Aug 2023 13:47)  Vitamin D3 125 mcg (5000 intl units) oral tablet: 1 orally once a day (03 Aug 2023 13:47)  zinc (as acetate) 50 mg oral capsule: 1 orally once a day (03 Aug 2023 13:47)      PAST MEDICAL & SURGICAL HISTORY:  HLD (hyperlipidemia)      Osteoarthrosis      Hearing loss      Heart murmur      Left knee pain      Hearing impairment      Seasonal allergies      GERD (gastroesophageal reflux disease)      History of psoriasis      History of iron deficiency anemia      History of sciatica      History of chronic cough      Former smoker      Thickened endometrium      History of appendectomy  1969      Thymus disorder  removal- 1954      Ganglion cyst of wrist  removal 1990      S/P trigger finger release  2005      History of bladder repair surgery  Bladder lift 12/2007      H/O repair of left rotator cuff  2010      Status post left partial knee replacement      History of arthroscopy of both knees      History of inguinal hernia repair          Review of Systems:   CONSTITUTIONAL: No fever, weight loss, or fatigue  EYES: No eye pain, visual disturbances, or discharge  ENMT:  No difficulty hearing, tinnitus, vertigo; No sinus or throat pain  NECK: No pain or stiffness  RESPIRATORY: No cough, wheezing, chills or hemoptysis; No shortness of breath  CARDIOVASCULAR: No chest pain, palpitations, dizziness, or leg swelling  GASTROINTESTINAL: No abdominal or epigastric pain. No nausea, vomiting, or hematemesis; No diarrhea or constipation. No melena or hematochezia.  GENITOURINARY: No dysuria, frequency, hematuria, or incontinence  NEUROLOGICAL: No headaches, memory loss, loss of strength, numbness, or tremors  SKIN: No itching, burning, rashes, or lesions   LYMPH NODES: No enlarged glands  ENDOCRINE: No heat or cold intolerance; No hair loss  MUSCULOSKELETAL: No joint pain or swelling; No muscle, back, or extremity pain  PSYCHIATRIC: No depression, anxiety, mood swings, or difficulty sleeping      Allergies    Pseudogest-DM (Pruritus)  percocet (Rash)  Vicodin (Rash)  oxycodone (Unknown)    Intolerances    tramadol (Pruritus)      Social History:     FAMILY HISTORY:  No pertinent family history in first degree relatives        Vital Signs Last 24 Hrs  T(C): 36.6 (04 Aug 2023 05:18), Max: 36.8 (04 Aug 2023 01:26)  T(F): 97.9 (04 Aug 2023 05:18), Max: 98.2 (04 Aug 2023 01:26)  HR: 90 (04 Aug 2023 05:18) (73 - 97)  BP: 111/70 (04 Aug 2023 05:18) (104/68 - 139/60)  BP(mean): 78 (03 Aug 2023 23:00) (74 - 97)  RR: 18 (04 Aug 2023 05:18) (16 - 18)  SpO2: 95% (04 Aug 2023 05:18) (94% - 99%)    Parameters below as of 04 Aug 2023 05:18  Patient On (Oxygen Delivery Method): room air      CAPILLARY BLOOD GLUCOSE      POCT Blood Glucose.: 103 mg/dL (03 Aug 2023 12:53)      PHYSICAL EXAM:  GENERAL: NAD, well-developed  HEAD:  NCAT  EYES: EOMI  NECK: Supple, No JVD  CHEST/LUNG: Clear to auscultation bilaterally; No wheeze  HEART: Reg rate. No M/R/G.  ABDOMEN: Soft, NT/ND, Bowel sounds present  EXTREMITIES:  LLE in immobilizer, wrapped with ACE bandage, + drain  PSYCH: AAOx3  NEUROLOGY: non-focal  SKIN: No rashes or lesions    LABS:                    Culture - Joint Fluid (collected 03 Aug 2023 22:39)  Source: Knee left knee synovial fluid  Gram Stain (04 Aug 2023 06:24):    No polymorphonuclear cells seen per low power field    No organisms seen per oil power field    Culture - Tissue with Gram Stain (collected 03 Aug 2023 22:39)  Source: .Tissue Other  Gram Stain (04 Aug 2023 06:49):    Rare polymorphonuclear leukocytes seen per low power field    No organisms seen per oil power field    Culture - Tissue with Gram Stain (collected 03 Aug 2023 22:39)  Source: .Tissue Other  Gram Stain (04 Aug 2023 06:25):    No polymorphonuclear cells seen per low power field    No organisms seen per oil power field        MEDICATIONS  (STANDING):  acetaminophen   IVPB .. 1000 milliGRAM(s) IV Intermittent once  acetaminophen   IVPB .. 1000 milliGRAM(s) IV Intermittent once  amitriptyline 20 milliGRAM(s) Oral at bedtime  apixaban 2.5 milliGRAM(s) Oral two times a day  atorvastatin 20 milliGRAM(s) Oral at bedtime  ceFAZolin   IVPB 2000 milliGRAM(s) IV Intermittent every 8 hours  celecoxib 200 milliGRAM(s) Oral every 12 hours  ferrous    sulfate 325 milliGRAM(s) Oral daily  gabapentin 1200 milliGRAM(s) Oral daily  ketorolac   Injectable 15 milliGRAM(s) IV Push every 6 hours  lidocaine 1% Injectable 0.2 milliLiter(s) Local Injection once  montelukast 10 milliGRAM(s) Oral daily  multivitamin 1 Tablet(s) Oral daily  oxybutynin 5 milliGRAM(s) Oral two times a day  pantoprazole    Tablet 40 milliGRAM(s) Oral before breakfast  polyethylene glycol 3350 17 Gram(s) Oral at bedtime  pregabalin 75 milliGRAM(s) Oral two times a day  senna 2 Tablet(s) Oral at bedtime    MEDICATIONS  (PRN):  diphenhydrAMINE Injectable 25 milliGRAM(s) IV Push once PRN Allergy symptoms  magnesium hydroxide Suspension 30 milliLiter(s) Oral daily PRN Constipation  mometasone 220 MICROgram(s) Inhaler 2 Puff(s) Inhalation two times a day PRN cough  ondansetron Injectable 4 milliGRAM(s) IV Push every 6 hours PRN Nausea and/or Vomiting  traMADol 50 milliGRAM(s) Oral every 6 hours PRN Moderate Pain (4 - 6)  traMADol 25 milliGRAM(s) Oral every 6 hours PRN Mild Pain (1 - 3)  traMADol 100 milliGRAM(s) Oral every 6 hours PRN Severe Pain (7 - 10)

## 2023-08-04 NOTE — CONSULT NOTE ADULT - PROBLEM SELECTOR RECOMMENDATION 2
Follows outpatient with Pulm, Dr. Azevedo  - c/w home inhalers  - c/w singulair  - outpatient pulm f/u

## 2023-08-04 NOTE — CONSULT NOTE ADULT - PROBLEM SELECTOR RECOMMENDATION 9
s/p left total knee replacement revision with Tony robot on 8/4  - Agree with multimodal pain control  - Bowel regimen   - PT/OT  - IS while awake

## 2023-08-04 NOTE — PROGRESS NOTE ADULT - NUTRITIONAL ASSESSMENT
A/P:  73F POD#1 s/p jeffrey-assisted revision L TKA , recovering well.    - Pain control  - WBAT  - DVT ppx:  - will work with PT/OT  - Monitor JAMEEL output   - OOB  - Diet: regular   - Monitor I&Os  - Dispo: TBD      For all questions related to patient care, please reach out via the on-call pager.*     Eli Tejeda PGY2  Orthopedic Surgery  [facility]: [phone]  [facility]: [phone]  [facility]: [phone]

## 2023-08-04 NOTE — CONSULT NOTE ADULT - ASSESSMENT
72 y/o female with HLD, GERD, chronic cough presented for left total knee replacement revision with Tony robot

## 2023-08-04 NOTE — OCCUPATIONAL THERAPY INITIAL EVALUATION ADULT - PERTINENT HX OF CURRENT PROBLEM, REHAB EVAL
73 yr old F with hearing impairment  ( does not know sign language , able to communicate by lip reading ),  with history of HLD,  heart murmur ( functional ), Iron deficiency anemia , GERD, underwent partial left knee replacement 10/2020 ( HSS) few days later sustained a medial tibial plateau fracture s/p ORIF . Patient reports worsening left knee pain 7/10,  at times difficulty walking. Presents to PST for scheduled left total knee replacement revision with Tony robot on 8/3/2023. Patient denies fever, chills, no acute complaints. Ambulating without assistance. Pt now s/p left total knee replacement revision with Tony robot on 8/4    Pain Goal: 0 /10.  GOC " be pain free "  CT KNEE: Status post left knee hemiarthroplasty with changes of osteoarthritis within the lateral and patellofemoral compartments as described.

## 2023-08-04 NOTE — OCCUPATIONAL THERAPY INITIAL EVALUATION ADULT - LIVES WITH, PROFILE
Pt reports livin6 [facility] [address] to 2nd floor with walk-in shower. and shower chair. Pt owns a RW, which she reports not needing to use prior to sx. Reports being IND prior with all ADLs and mobility./spouse

## 2023-08-05 RX ADMIN — Medication 75 MILLIGRAM(S): at 17:35

## 2023-08-05 RX ADMIN — TRAMADOL HYDROCHLORIDE 100 MILLIGRAM(S): 50 TABLET ORAL at 02:44

## 2023-08-05 RX ADMIN — Medication 5 MILLIGRAM(S): at 05:38

## 2023-08-05 RX ADMIN — Medication 1 TABLET(S): at 13:00

## 2023-08-05 RX ADMIN — PANTOPRAZOLE SODIUM 40 MILLIGRAM(S): 20 TABLET, DELAYED RELEASE ORAL at 05:38

## 2023-08-05 RX ADMIN — SENNA PLUS 2 TABLET(S): 8.6 TABLET ORAL at 22:53

## 2023-08-05 RX ADMIN — Medication 975 MILLIGRAM(S): at 18:56

## 2023-08-05 RX ADMIN — CELECOXIB 200 MILLIGRAM(S): 200 CAPSULE ORAL at 05:37

## 2023-08-05 RX ADMIN — APIXABAN 2.5 MILLIGRAM(S): 2.5 TABLET, FILM COATED ORAL at 05:38

## 2023-08-05 RX ADMIN — TRAMADOL HYDROCHLORIDE 100 MILLIGRAM(S): 50 TABLET ORAL at 10:46

## 2023-08-05 RX ADMIN — GABAPENTIN 1200 MILLIGRAM(S): 400 CAPSULE ORAL at 13:00

## 2023-08-05 RX ADMIN — CELECOXIB 200 MILLIGRAM(S): 200 CAPSULE ORAL at 17:30

## 2023-08-05 RX ADMIN — TRAMADOL HYDROCHLORIDE 100 MILLIGRAM(S): 50 TABLET ORAL at 17:24

## 2023-08-05 RX ADMIN — Medication 325 MILLIGRAM(S): at 12:59

## 2023-08-05 RX ADMIN — ATORVASTATIN CALCIUM 20 MILLIGRAM(S): 80 TABLET, FILM COATED ORAL at 22:53

## 2023-08-05 RX ADMIN — TRAMADOL HYDROCHLORIDE 100 MILLIGRAM(S): 50 TABLET ORAL at 23:55

## 2023-08-05 RX ADMIN — Medication 975 MILLIGRAM(S): at 11:45

## 2023-08-05 RX ADMIN — Medication 75 MILLIGRAM(S): at 05:37

## 2023-08-05 RX ADMIN — Medication 5 MILLIGRAM(S): at 17:30

## 2023-08-05 RX ADMIN — TRAMADOL HYDROCHLORIDE 100 MILLIGRAM(S): 50 TABLET ORAL at 22:53

## 2023-08-05 RX ADMIN — Medication 20 MILLIGRAM(S): at 23:07

## 2023-08-05 RX ADMIN — Medication 975 MILLIGRAM(S): at 02:03

## 2023-08-05 RX ADMIN — APIXABAN 2.5 MILLIGRAM(S): 2.5 TABLET, FILM COATED ORAL at 17:30

## 2023-08-05 RX ADMIN — CELECOXIB 200 MILLIGRAM(S): 200 CAPSULE ORAL at 18:57

## 2023-08-05 RX ADMIN — Medication 975 MILLIGRAM(S): at 10:35

## 2023-08-05 RX ADMIN — TRAMADOL HYDROCHLORIDE 100 MILLIGRAM(S): 50 TABLET ORAL at 16:30

## 2023-08-05 RX ADMIN — MONTELUKAST 10 MILLIGRAM(S): 4 TABLET, CHEWABLE ORAL at 13:00

## 2023-08-05 RX ADMIN — TRAMADOL HYDROCHLORIDE 100 MILLIGRAM(S): 50 TABLET ORAL at 11:45

## 2023-08-05 RX ADMIN — POLYETHYLENE GLYCOL 3350 17 GRAM(S): 17 POWDER, FOR SOLUTION ORAL at 22:53

## 2023-08-05 RX ADMIN — TRAMADOL HYDROCHLORIDE 100 MILLIGRAM(S): 50 TABLET ORAL at 03:40

## 2023-08-05 RX ADMIN — Medication 975 MILLIGRAM(S): at 17:31

## 2023-08-05 NOTE — CHART NOTE - NSCHARTNOTEFT_GEN_A_CORE
As per attendings instruction, J/P drain was discontinued without complication, tip intact.  Elizabeth Toledo PA-C  Orthopaedic Surgery  Team pager [phone]  [facility] [phone]  [phone]

## 2023-08-06 RX ADMIN — APIXABAN 2.5 MILLIGRAM(S): 2.5 TABLET, FILM COATED ORAL at 06:11

## 2023-08-06 RX ADMIN — Medication 975 MILLIGRAM(S): at 17:54

## 2023-08-06 RX ADMIN — POLYETHYLENE GLYCOL 3350 17 GRAM(S): 17 POWDER, FOR SOLUTION ORAL at 21:42

## 2023-08-06 RX ADMIN — Medication 975 MILLIGRAM(S): at 11:49

## 2023-08-06 RX ADMIN — Medication 975 MILLIGRAM(S): at 18:54

## 2023-08-06 RX ADMIN — MONTELUKAST 10 MILLIGRAM(S): 4 TABLET, CHEWABLE ORAL at 11:49

## 2023-08-06 RX ADMIN — CELECOXIB 200 MILLIGRAM(S): 200 CAPSULE ORAL at 06:10

## 2023-08-06 RX ADMIN — TRAMADOL HYDROCHLORIDE 50 MILLIGRAM(S): 50 TABLET ORAL at 22:15

## 2023-08-06 RX ADMIN — CELECOXIB 200 MILLIGRAM(S): 200 CAPSULE ORAL at 06:40

## 2023-08-06 RX ADMIN — SENNA PLUS 2 TABLET(S): 8.6 TABLET ORAL at 21:42

## 2023-08-06 RX ADMIN — TRAMADOL HYDROCHLORIDE 100 MILLIGRAM(S): 50 TABLET ORAL at 06:40

## 2023-08-06 RX ADMIN — Medication 325 MILLIGRAM(S): at 11:49

## 2023-08-06 RX ADMIN — Medication 5 MILLIGRAM(S): at 06:11

## 2023-08-06 RX ADMIN — TRAMADOL HYDROCHLORIDE 50 MILLIGRAM(S): 50 TABLET ORAL at 21:42

## 2023-08-06 RX ADMIN — Medication 75 MILLIGRAM(S): at 06:10

## 2023-08-06 RX ADMIN — Medication 75 MILLIGRAM(S): at 17:55

## 2023-08-06 RX ADMIN — CELECOXIB 200 MILLIGRAM(S): 200 CAPSULE ORAL at 17:53

## 2023-08-06 RX ADMIN — ATORVASTATIN CALCIUM 20 MILLIGRAM(S): 80 TABLET, FILM COATED ORAL at 21:42

## 2023-08-06 RX ADMIN — Medication 1 TABLET(S): at 11:48

## 2023-08-06 RX ADMIN — PANTOPRAZOLE SODIUM 40 MILLIGRAM(S): 20 TABLET, DELAYED RELEASE ORAL at 06:10

## 2023-08-06 RX ADMIN — Medication 5 MILLIGRAM(S): at 17:53

## 2023-08-06 RX ADMIN — CELECOXIB 200 MILLIGRAM(S): 200 CAPSULE ORAL at 18:54

## 2023-08-06 RX ADMIN — APIXABAN 2.5 MILLIGRAM(S): 2.5 TABLET, FILM COATED ORAL at 17:53

## 2023-08-06 RX ADMIN — GABAPENTIN 1200 MILLIGRAM(S): 400 CAPSULE ORAL at 11:49

## 2023-08-06 RX ADMIN — TRAMADOL HYDROCHLORIDE 100 MILLIGRAM(S): 50 TABLET ORAL at 06:10

## 2023-08-06 RX ADMIN — Medication 975 MILLIGRAM(S): at 12:49

## 2023-08-06 RX ADMIN — Medication 20 MILLIGRAM(S): at 21:41

## 2023-08-06 NOTE — PROGRESS NOTE ADULT - ATTENDING COMMENTS
I agree with the above note and have personally seen and examined this patient. All pertinent films have been reviewed. Please refer to clinical documentation of the history, physical examinations, data summary, and both assessment and plan as documented above and with which I agree.    Mikey Sanchez MD  Attending Orthopedic Surgeon

## 2023-08-07 ENCOUNTER — TRANSCRIPTION ENCOUNTER (OUTPATIENT)
Age: 73
End: 2023-08-07

## 2023-08-07 VITALS
RESPIRATION RATE: 18 BRPM | TEMPERATURE: 98 F | HEART RATE: 85 BPM | OXYGEN SATURATION: 94 % | SYSTOLIC BLOOD PRESSURE: 104 MMHG | DIASTOLIC BLOOD PRESSURE: 69 MMHG

## 2023-08-07 LAB
ANION GAP SERPL CALC-SCNC: 10 MMOL/L — SIGNIFICANT CHANGE UP (ref 5–17)
BLD GP AB SCN SERPL QL: NEGATIVE — SIGNIFICANT CHANGE UP
BUN SERPL-MCNC: 12 MG/DL — SIGNIFICANT CHANGE UP (ref 7–23)
CALCIUM SERPL-MCNC: 8.7 MG/DL — SIGNIFICANT CHANGE UP (ref 8.4–10.5)
CHLORIDE SERPL-SCNC: 105 MMOL/L — SIGNIFICANT CHANGE UP (ref 96–108)
CO2 SERPL-SCNC: 24 MMOL/L — SIGNIFICANT CHANGE UP (ref 22–31)
CREAT SERPL-MCNC: 0.64 MG/DL — SIGNIFICANT CHANGE UP (ref 0.5–1.3)
EGFR: 93 ML/MIN/1.73M2 — SIGNIFICANT CHANGE UP
GLUCOSE SERPL-MCNC: 100 MG/DL — HIGH (ref 70–99)
HCT VFR BLD CALC: 34.4 % — LOW (ref 34.5–45)
HGB BLD-MCNC: 10.9 G/DL — LOW (ref 11.5–15.5)
MCHC RBC-ENTMCNC: 29.1 PG — SIGNIFICANT CHANGE UP (ref 27–34)
MCHC RBC-ENTMCNC: 31.7 GM/DL — LOW (ref 32–36)
MCV RBC AUTO: 92 FL — SIGNIFICANT CHANGE UP (ref 80–100)
NRBC # BLD: 0 /100 WBCS — SIGNIFICANT CHANGE UP (ref 0–0)
PLATELET # BLD AUTO: 259 K/UL — SIGNIFICANT CHANGE UP (ref 150–400)
POTASSIUM SERPL-MCNC: 4 MMOL/L — SIGNIFICANT CHANGE UP (ref 3.5–5.3)
POTASSIUM SERPL-SCNC: 4 MMOL/L — SIGNIFICANT CHANGE UP (ref 3.5–5.3)
RBC # BLD: 3.74 M/UL — LOW (ref 3.8–5.2)
RBC # FLD: 13.1 % — SIGNIFICANT CHANGE UP (ref 10.3–14.5)
RH IG SCN BLD-IMP: POSITIVE — SIGNIFICANT CHANGE UP
SODIUM SERPL-SCNC: 139 MMOL/L — SIGNIFICANT CHANGE UP (ref 135–145)
WBC # BLD: 8.38 K/UL — SIGNIFICANT CHANGE UP (ref 3.8–10.5)
WBC # FLD AUTO: 8.38 K/UL — SIGNIFICANT CHANGE UP (ref 3.8–10.5)

## 2023-08-07 PROCEDURE — 36415 COLL VENOUS BLD VENIPUNCTURE: CPT

## 2023-08-07 PROCEDURE — 97116 GAIT TRAINING THERAPY: CPT

## 2023-08-07 PROCEDURE — 85027 COMPLETE CBC AUTOMATED: CPT

## 2023-08-07 PROCEDURE — 73560 X-RAY EXAM OF KNEE 1 OR 2: CPT

## 2023-08-07 PROCEDURE — 97535 SELF CARE MNGMENT TRAINING: CPT

## 2023-08-07 PROCEDURE — 82962 GLUCOSE BLOOD TEST: CPT

## 2023-08-07 PROCEDURE — 97161 PT EVAL LOW COMPLEX 20 MIN: CPT

## 2023-08-07 PROCEDURE — 99232 SBSQ HOSP IP/OBS MODERATE 35: CPT

## 2023-08-07 PROCEDURE — 87206 SMEAR FLUORESCENT/ACID STAI: CPT

## 2023-08-07 PROCEDURE — S2900: CPT

## 2023-08-07 PROCEDURE — 87015 SPECIMEN INFECT AGNT CONCNTJ: CPT

## 2023-08-07 PROCEDURE — 86901 BLOOD TYPING SEROLOGIC RH(D): CPT

## 2023-08-07 PROCEDURE — 97165 OT EVAL LOW COMPLEX 30 MIN: CPT

## 2023-08-07 PROCEDURE — 86900 BLOOD TYPING SEROLOGIC ABO: CPT

## 2023-08-07 PROCEDURE — 87075 CULTR BACTERIA EXCEPT BLOOD: CPT

## 2023-08-07 PROCEDURE — 87116 MYCOBACTERIA CULTURE: CPT

## 2023-08-07 PROCEDURE — 80048 BASIC METABOLIC PNL TOTAL CA: CPT

## 2023-08-07 PROCEDURE — 86850 RBC ANTIBODY SCREEN: CPT

## 2023-08-07 PROCEDURE — 87205 SMEAR GRAM STAIN: CPT

## 2023-08-07 PROCEDURE — 97530 THERAPEUTIC ACTIVITIES: CPT

## 2023-08-07 PROCEDURE — 87102 FUNGUS ISOLATION CULTURE: CPT

## 2023-08-07 PROCEDURE — 87070 CULTURE OTHR SPECIMN AEROBIC: CPT

## 2023-08-07 PROCEDURE — C1776: CPT

## 2023-08-07 PROCEDURE — C1713: CPT

## 2023-08-07 PROCEDURE — 97110 THERAPEUTIC EXERCISES: CPT

## 2023-08-07 RX ORDER — ACETAMINOPHEN 500 MG
3 TABLET ORAL
Qty: 63 | Refills: 0
Start: 2023-08-07 | End: 2023-08-13

## 2023-08-07 RX ORDER — POLYETHYLENE GLYCOL 3350 17 G/17G
17 POWDER, FOR SOLUTION ORAL
Qty: 0 | Refills: 0 | DISCHARGE
Start: 2023-08-07

## 2023-08-07 RX ORDER — NALOXONE HYDROCHLORIDE 4 MG/.1ML
4 SPRAY NASAL
Qty: 1 | Refills: 0
Start: 2023-08-07 | End: 2023-08-07

## 2023-08-07 RX ORDER — APIXABAN 2.5 MG/1
1 TABLET, FILM COATED ORAL
Qty: 0 | Refills: 0 | DISCHARGE
Start: 2023-08-07

## 2023-08-07 RX ORDER — PANTOPRAZOLE SODIUM 20 MG/1
1 TABLET, DELAYED RELEASE ORAL
Qty: 30 | Refills: 0
Start: 2023-08-07 | End: 2023-09-05

## 2023-08-07 RX ORDER — SENNA PLUS 8.6 MG/1
2 TABLET ORAL
Qty: 60 | Refills: 0
Start: 2023-08-07 | End: 2023-09-05

## 2023-08-07 RX ORDER — PANTOPRAZOLE SODIUM 20 MG/1
40 TABLET, DELAYED RELEASE ORAL
Refills: 0 | DISCHARGE

## 2023-08-07 RX ORDER — PREGABALIN 225 MG/1
1 CAPSULE ORAL
Refills: 0 | DISCHARGE

## 2023-08-07 RX ORDER — ZINC ACETATE DIHYDRATE 100 %
1 CRYSTALS MISCELLANEOUS
Refills: 0 | DISCHARGE

## 2023-08-07 RX ORDER — APIXABAN 2.5 MG/1
1 TABLET, FILM COATED ORAL
Qty: 60 | Refills: 0
Start: 2023-08-07 | End: 2023-09-05

## 2023-08-07 RX ORDER — ACETAMINOPHEN 500 MG
3 TABLET ORAL
Qty: 0 | Refills: 0 | DISCHARGE
Start: 2023-08-07

## 2023-08-07 RX ORDER — TRAMADOL HYDROCHLORIDE 50 MG/1
1 TABLET ORAL
Qty: 0 | Refills: 0 | DISCHARGE
Start: 2023-08-07

## 2023-08-07 RX ORDER — DOCUSATE SODIUM 100 MG
1 CAPSULE ORAL
Qty: 42 | Refills: 0
Start: 2023-08-07 | End: 2023-08-20

## 2023-08-07 RX ORDER — CHOLECALCIFEROL (VITAMIN D3) 125 MCG
1 CAPSULE ORAL
Refills: 0 | DISCHARGE

## 2023-08-07 RX ORDER — LUTEIN 20 MG
4 CAPSULE ORAL
Refills: 0 | DISCHARGE

## 2023-08-07 RX ORDER — OMEGA-3 ACID ETHYL ESTERS 1 G
1 CAPSULE ORAL
Refills: 0 | DISCHARGE

## 2023-08-07 RX ORDER — TRAMADOL HYDROCHLORIDE 50 MG/1
1 TABLET ORAL
Qty: 42 | Refills: 0
Start: 2023-08-07 | End: 2023-08-13

## 2023-08-07 RX ADMIN — CELECOXIB 200 MILLIGRAM(S): 200 CAPSULE ORAL at 05:51

## 2023-08-07 RX ADMIN — MONTELUKAST 10 MILLIGRAM(S): 4 TABLET, CHEWABLE ORAL at 11:25

## 2023-08-07 RX ADMIN — Medication 975 MILLIGRAM(S): at 05:51

## 2023-08-07 RX ADMIN — GABAPENTIN 1200 MILLIGRAM(S): 400 CAPSULE ORAL at 11:25

## 2023-08-07 RX ADMIN — TRAMADOL HYDROCHLORIDE 50 MILLIGRAM(S): 50 TABLET ORAL at 05:51

## 2023-08-07 RX ADMIN — Medication 975 MILLIGRAM(S): at 10:46

## 2023-08-07 RX ADMIN — Medication 325 MILLIGRAM(S): at 11:25

## 2023-08-07 RX ADMIN — Medication 975 MILLIGRAM(S): at 06:21

## 2023-08-07 RX ADMIN — Medication 5 MILLIGRAM(S): at 05:51

## 2023-08-07 RX ADMIN — PANTOPRAZOLE SODIUM 40 MILLIGRAM(S): 20 TABLET, DELAYED RELEASE ORAL at 05:51

## 2023-08-07 RX ADMIN — Medication 1 TABLET(S): at 11:25

## 2023-08-07 RX ADMIN — APIXABAN 2.5 MILLIGRAM(S): 2.5 TABLET, FILM COATED ORAL at 05:51

## 2023-08-07 RX ADMIN — CELECOXIB 200 MILLIGRAM(S): 200 CAPSULE ORAL at 06:21

## 2023-08-07 RX ADMIN — Medication 75 MILLIGRAM(S): at 05:51

## 2023-08-07 RX ADMIN — TRAMADOL HYDROCHLORIDE 50 MILLIGRAM(S): 50 TABLET ORAL at 06:21

## 2023-08-07 RX ADMIN — Medication 975 MILLIGRAM(S): at 10:16

## 2023-08-07 NOTE — DISCHARGE NOTE PROVIDER - NSDCFUSCHEDAPPT_GEN_ALL_CORE_FT
[facility] Physician [facility]  ORTHOSURG [address]  Scheduled Appointment: 08/15/2023

## 2023-08-07 NOTE — PROGRESS NOTE ADULT - PROBLEM/PLAN-3
DISPLAY PLAN FREE TEXT Cartilage Graft Text: The defect edges were debeveled with a #15 scalpel blade.  Given the location of the defect, shape of the defect, the fact the defect involved a full thickness cartilage defect a cartilage graft was deemed most appropriate.  An appropriate donor site was identified, cleansed, and anesthetized. The cartilage graft was then harvested and transferred to the recipient site, oriented appropriately and then sutured into place.  The secondary defect was then repaired using a primary closure.

## 2023-08-07 NOTE — DISCHARGE NOTE NURSING/CASE MANAGEMENT/SOCIAL WORK - PATIENT PORTAL LINK FT
You can access the FollowMyHealth Patient Portal offered by [facility] by registering at the following website: [email or web address]. By joining [facility]’s FollowMyHealth portal, you will also be able to view your health information using other applications (apps) compatible with our system.

## 2023-08-07 NOTE — DISCHARGE NOTE PROVIDER - CARE PROVIDER_API CALL
Mikey Sanchez  Orthopaedic Surgery  [address], [address]  [place] [address]  Phone: [phone]  Fax: [phone]  Established Patient  Scheduled Appointment: 08/15/2023

## 2023-08-07 NOTE — PROGRESS NOTE ADULT - PROBLEM SELECTOR PROBLEM 5
Need for prophylactic measure [] : No [Yes] : Yes [Monthly or less (1 pt)] : Monthly or less (1 point) [1 or 2 (0 pts)] : 1 or 2 (0 points) [Never (0 pts)] : Never (0 points) [No] : In the past 12 months have you used drugs other than those required for medical reasons? No [No falls in past year] : Patient reported no falls in the past year [0] : 2) Feeling down, depressed, or hopeless: Not at all (0) [Audit-CScore] : 1 [[id number]] : 0

## 2023-08-07 NOTE — PROGRESS NOTE ADULT - PROBLEM SELECTOR PLAN 1
s/p left total knee replacement revision with Tony robot on 8/4  - Agree with multimodal pain control  - Bowel regimen   - PT/OT recommend home PT  - IS while awake.

## 2023-08-07 NOTE — PROGRESS NOTE ADULT - ASSESSMENT
72 y/o female with HLD, GERD, chronic cough presented for left total knee replacement revision with Tony robot 
72 y/o FM s/p left total knee arthroplasty revision POD#3, physical therapy, d/c home once cleaned by PT/OT  Elizabeth Toledo PA-C  Orthopaedic Surgery  Team pager [phone]  [facility] [phone]  [phone]  
72 y/o FM s/p Left total knee arthroplasty revision(jeffrey) POD#2, physical therapy, D/C planning  Elizabeth Toledo PA-C  Orthopaedic Surgery  Team pager [phone]  [facility] [phone]  [phone]

## 2023-08-07 NOTE — PROGRESS NOTE ADULT - PROBLEM SELECTOR PLAN 2
Follows outpatient with Pulm, Dr. Azevedo  - c/w home inhalers  - c/w singulair  - outpatient pulm f/u.

## 2023-08-07 NOTE — DISCHARGE NOTE NURSING/CASE MANAGEMENT/SOCIAL WORK - NSDCPEFALRISK_GEN_ALL_CORE
For information on Fall & Injury Prevention, visit: [email or web address] OR  [email or web address] OR  https://www.cdc.gov/steadi/patient.html

## 2023-08-07 NOTE — DISCHARGE NOTE PROVIDER - NSDCHHNEEDSERVICEOTHER_GEN_ALL_CORE_FT
Bandage Instructions:  Prevena wound vac placed to incision. Prevena wound vac to be removed on POD #6(8/7/23) by home care nursing, and replaced with aquacel dressing (supplied to the patient at discharge).   Aquacel dressing to remain intact, and will be removed by Dr. Sanchez or his team at the 1st post operative appointment.  Bandage Instructions:  Prevena wound vac placed to incision. Prevena wound vac to be removed on POD#6 (8/7/23) by home care nursing, and replaced with aquacel dressing (supplied to the patient at discharge).   Aquacel dressing to remain intact, and will be removed by Dr. Sanchez or his team at the 1st post operative appointment.

## 2023-08-07 NOTE — DISCHARGE NOTE PROVIDER - NSDCCPTREATMENT_GEN_ALL_CORE_FT
PRINCIPAL PROCEDURE  Procedure: Revision total knee arthroplasty  Findings and Treatment:      PRINCIPAL PROCEDURE  Procedure: Revision total knee arthroplasty  Findings and Treatment: LEFT

## 2023-08-07 NOTE — PROGRESS NOTE ADULT - SUBJECTIVE AND OBJECTIVE BOX
Orthopedics     Patient seen and examined at bedside, resting comfortably. No acute events overnight. Pain adequately controlled. Patient feeling well. Denies CP/SOB. No nausea or vomiting. No other acute complaints at this time    Vital Signs Last 24 Hrs  T(C): 36.4 (08-07-23 @ 04:42), Max: 36.8 (08-06-23 @ 20:26)  T(F): 97.5 (08-07-23 @ 04:42), Max: 98.2 (08-06-23 @ 20:26)  HR: 79 (08-07-23 @ 04:42) (77 - 85)  BP: 111/77 (08-07-23 @ 04:42) (102/67 - 119/79)  BP(mean): --  RR: 18 (08-07-23 @ 04:42) (18 - 18)  SpO2: 94% (08-07-23 @ 04:42) (94% - 97%)      Exam:  Gen: NAD, Awake and alert  LLE  Dressing clean and dry  +EHL/FHL/TA/GS  SILT L2-S1  +DP  Calf Soft NT  Compartments soft and compressible    A/P:  Patient is a 73y Female s/p revision L TKA Stable POD 4    -FU am labs  -Ice/Elevate  -Incentive Spirometry  -Multimodal Analgesia  -DVT PE ppx  -SCDs  -PT/OT OOB  -WBAT  -Discharge planning - home  -Will discuss w/ attending and advise if plan changes
Orthopedic Surgery Progress Note     S: Patient seen and examined today. No acute events overnight. Pain is well controlled. Denies f/c, chest pain, shortness of breath, dizziness. She has not yet worked with PT.     MEDICATIONS  (STANDING):  acetaminophen   IVPB .. 1000 milliGRAM(s) IV Intermittent once  acetaminophen   IVPB .. 1000 milliGRAM(s) IV Intermittent once  amitriptyline 20 milliGRAM(s) Oral at bedtime  apixaban 2.5 milliGRAM(s) Oral two times a day  atorvastatin 20 milliGRAM(s) Oral at bedtime  ceFAZolin   IVPB 2000 milliGRAM(s) IV Intermittent every 8 hours  celecoxib 200 milliGRAM(s) Oral every 12 hours  ferrous    sulfate 325 milliGRAM(s) Oral daily  gabapentin 1200 milliGRAM(s) Oral daily  ketorolac   Injectable 15 milliGRAM(s) IV Push every 6 hours  lidocaine 1% Injectable 0.2 milliLiter(s) Local Injection once  montelukast 10 milliGRAM(s) Oral daily  multivitamin 1 Tablet(s) Oral daily  oxybutynin 5 milliGRAM(s) Oral two times a day  pantoprazole    Tablet 40 milliGRAM(s) Oral before breakfast  polyethylene glycol 3350 17 Gram(s) Oral at bedtime  pregabalin 75 milliGRAM(s) Oral two times a day  senna 2 Tablet(s) Oral at bedtime    MEDICATIONS  (PRN):  diphenhydrAMINE Injectable 25 milliGRAM(s) IV Push once PRN Allergy symptoms  magnesium hydroxide Suspension 30 milliLiter(s) Oral daily PRN Constipation  mometasone 220 MICROgram(s) Inhaler 2 Puff(s) Inhalation two times a day PRN cough  ondansetron Injectable 4 milliGRAM(s) IV Push every 6 hours PRN Nausea and/or Vomiting  traMADol 50 milliGRAM(s) Oral every 6 hours PRN Moderate Pain (4 - 6)  traMADol 25 milliGRAM(s) Oral every 6 hours PRN Mild Pain (1 - 3)  traMADol 100 milliGRAM(s) Oral every 6 hours PRN Severe Pain (7 - 10)      Vital Signs Last 24 Hrs  T(C): 36.6 (04 Aug 2023 05:18), Max: 36.8 (04 Aug 2023 01:26)  T(F): 97.9 (04 Aug 2023 05:18), Max: 98.2 (04 Aug 2023 01:26)  HR: 90 (04 Aug 2023 05:18) (73 - 97)  BP: 111/70 (04 Aug 2023 05:18) (104/68 - 139/60)  BP(mean): 78 (03 Aug 2023 23:00) (74 - 97)  RR: 18 (04 Aug 2023 05:18) (16 - 18)  SpO2: 95% (04 Aug 2023 05:18) (94% - 99%)    Parameters below as of 04 Aug 2023 05:18  Patient On (Oxygen Delivery Method): room air        08-03-23 @ 07:01  -  08-04-23 @ 06:48  --------------------------------------------------------  IN: 1150 mL / OUT: 1335 mL / NET: -185 mL        Physical Exam:  Gen: NAD  Left Lower Extremity:  SILT S/S/DP/SP/T  +EHL/FHL/TA/GSC  Compartments soft/non-tender  Mild tenderness around knee  Toes warm, Cap refill brisk/warm and perfused, +DP/PT pulse   Incision c/d  In knee immobilizer  JAMEEL drain with serosanguineous output           LABS:        
Patient is a 73y old  Female who presents with a chief complaint of L TKA pain  Patient s/p Left total knee arthroplasty revision(jeffrey) POD#2  Patient comfortable  No complaints    T(C): 36.4 (08-05-23 @ 04:20), Max: 37.2 (08-04-23 @ 16:03)  HR: 74 (08-05-23 @ 04:20) (74 - 98)  BP: 104/62 (08-05-23 @ 04:20) (100/54 - 114/75)  RR: 18 (08-05-23 @ 04:20) (16 - 18)  SpO2: 95% (08-05-23 @ 04:20) (93% - 95%)    PHYSICAL EXAM:  NAD, Alert  [ Left] Knee: Knee immobilizer, Bulky Landrum dressing, J/P 35/35/ Pravena Dressing C/D/I; sensation  intact; (+) DF/PF; (+) Distal Pulses;   LABS:                      12.9   12.36 )-----------( 297      ( 04 Aug 2023 07:00 )             40.1   08-04    141  |  106  |  17  ----------------------------<  192<H>  4.1   |  21<L>  |  0.85  Ca    8.8      04 Aug 2023 07:00          
Patient is a 73y old  Female who presents with a chief complaint of Left TKA pain  Patient s/p left total knee arthroplasty revision POD#3  Patient comfortable  No complaints    T(C): 36.6 (08-06-23 @ 06:39), Max: 37 (08-05-23 @ 22:48)  HR: 79 (08-06-23 @ 06:39) (75 - 88)  BP: 102/67 (08-06-23 @ 06:39) (99/60 - 115/69)  RR: 18 (08-06-23 @ 06:39) (18 - 18)  SpO2: 94% (08-06-23 @ 06:39) (94% - 96%)    PHYSICAL EXAM:  NAD, Alert  [Left ] Knee:Prevena Dressing C/D/I; in BJKI, sensation grossly intact to light touch; (+) DF/PF; (+) Distal Pulses; No Calf tenderness B/L, PAS             
Joseph Rodas MD  Division of Hospital Medicine  Available via MS teams  ---------------------------------------------------------    ROBERT CRAWLEY  73y  Female      Patient is a 73y old  Female who presents with a chief complaint of Revision total knee replacement  (07 Aug 2023 05:08)      INTERVAL HPI/OVERNIGHT EVENTS:  Seen sitting in chair. Pain managed. Planned for d/c home today      REVIEW OF SYSTEMS: 10 point ROS negative unless listed above    T(C): 36.4 (08-07-23 @ 04:42), Max: 36.8 (08-06-23 @ 20:26)  HR: 79 (08-07-23 @ 04:42) (77 - 85)  BP: 111/77 (08-07-23 @ 04:42) (107/71 - 115/70)  RR: 18 (08-07-23 @ 04:42) (18 - 18)  SpO2: 94% (08-07-23 @ 04:42) (94% - 97%)  Wt(kg): --Vital Signs Last 24 Hrs  T(C): 36.4 (07 Aug 2023 04:42), Max: 36.8 (06 Aug 2023 20:26)  T(F): 97.5 (07 Aug 2023 04:42), Max: 98.2 (06 Aug 2023 20:26)  HR: 79 (07 Aug 2023 04:42) (77 - 85)  BP: 111/77 (07 Aug 2023 04:42) (107/71 - 115/70)  BP(mean): --  RR: 18 (07 Aug 2023 04:42) (18 - 18)  SpO2: 94% (07 Aug 2023 04:42) (94% - 97%)    Parameters below as of 07 Aug 2023 04:42  Patient On (Oxygen Delivery Method): room air        PHYSICAL EXAM:  GENERAL: NAD, well-developed  NECK: Supple, No JVD  CHEST/LUNG: Clear to auscultation bilaterally; No wheeze  HEART: Reg rate. No M/R/G.  ABDOMEN: Soft, NT/ND, Bowel sounds present  EXTREMITIES:  LLE with vac  PSYCH: AAOx3  NEUROLOGY: non-focal  SKIN: No rashes or lesions        LABS:                        10.9   8.38  )-----------( 259      ( 07 Aug 2023 06:34 )             34.4     08-07    139  |  105  |  12  ----------------------------<  100<H>  4.0   |  24  |  0.64    Ca    8.7      07 Aug 2023 06:35        Urinalysis Basic - ( 07 Aug 2023 06:35 )    Color: x / Appearance: x / SG: x / pH: x  Gluc: 100 mg/dL / Ketone: x  / Bili: x / Urobili: x   Blood: x / Protein: x / Nitrite: x   Leuk Esterase: x / RBC: x / WBC x   Sq Epi: x / Non Sq Epi: x / Bacteria: x      CAPILLARY BLOOD GLUCOSE            Urinalysis Basic - ( 07 Aug 2023 06:35 )    Color: x / Appearance: x / SG: x / pH: x  Gluc: 100 mg/dL / Ketone: x  / Bili: x / Urobili: x   Blood: x / Protein: x / Nitrite: x   Leuk Esterase: x / RBC: x / WBC x   Sq Epi: x / Non Sq Epi: x / Bacteria: x        RADIOLOGY & ADDITIONAL TESTS:    Imaging Personally Reviewed:  [ ] YES  [ ] NO

## 2023-08-07 NOTE — DISCHARGE NOTE PROVIDER - HOSPITAL COURSE
History of Present Illness:  73 yr old F with hearing impairment  ( does not know sign language , able to communicate by lip reading ),  with history of HLD,  heart murmur ( functional ), Iron deficiency anemia ,GERD, underwent partial left knee replacement 10/2020 ( [facility]) few days later sustained a medial tibial plateau fracture s/p ORIF . Patient reports worsening left knee pain 7/10,  at times difficulty walking. Presents to [facility] for scheduled left total knee replacement revision with Tony robot on 8/3/2023. Patient denies fever, chills, no acute complaints. Ambulating without assistance.    Goals of Care: "be pain free"     PAST MEDICAL HISTORY:  Former smoker   GERD (gastroesophageal reflux disease)   Hearing impairment   Hearing loss   Heart murmur   History of chronic cough   History of iron deficiency anemia   History of psoriasis   History of sciatica   HLD (hyperlipidemia)   Left knee pain   Osteoarthrosis   Seasonal allergies   Thickened endometrium.     PAST SURGICAL HISTORY:  Ganglion cyst of wrist removal 1990  H/O repair of left rotator cuff 2010  History of appendectomy 1969  History of arthroscopy of both knees   History of bladder repair surgery Bladder lift 12/2007  History of inguinal hernia repair   S/P trigger finger release 2005  Status post left partial knee replacement   Thymus disorder removal- 1954.     Hospital Course:  After admission on 8/3/23 and receiving pre-operative parenteral prophylactic antibiotics, the patient underwent an uncomplicated Revision left total knee replacement with TONY robotic assist with Dr. Sanchez. Patient tolerated the procedure well and was transferred to the recovery room in stable condition, with a stable neuro / vascular exam of the operated extremity.  Bandage Instructions:  Prevena wound vac placed to incision. Prevena wound vac to be removed on POD #6(8/9/23) by home care nursing, and replaced with aquacel dressing (supplied to the patient at discharge).   Aquacel dressing to remain intact, and will be removed by Dr. Sanchez or his team at the 1st post operative appointment.     Patient was placed on Eliquis 2.5mg BID for DVT ppx, and was placed on Protonix for GI protection.   Patient was made weight bearing as tolerated with the operative leg.     Patient evaluated by PT/OT and recommended for disposition for home with home PT.     Discharge and Orthopedic Care instructions were delineated in the Discharge Plan and reviewed with the patient. All medications were delineated in the medication reconciliation tool and key points were reviewed with the patient. They were deemed stable from an Orthopedic & medical standpoint for discharge on XXXXXX. History of Present Illness:  73 yr old F with hearing impairment  ( does not know sign language , able to communicate by lip reading ),  with history of HLD,  heart murmur ( functional ), Iron deficiency anemia ,GERD, underwent partial left knee replacement 10/2020 ( [facility]SS) few days later sustained a medial tibial plateau fracture s/p ORIF . Patient reports worsening left knee pain 7/10,  at times difficulty walking. Presents to [facility] for scheduled left total knee replacement revision with Tony robot on 8/3/2023. Patient denies fever, chills, no acute complaints. Ambulating without assistance.    Goals of Care: "be pain free"     PAST MEDICAL HISTORY:  Former smoker   GERD (gastroesophageal reflux disease)   Hearing impairment   Hearing loss   Heart murmur   History of chronic cough   History of iron deficiency anemia   History of psoriasis   History of sciatica   HLD (hyperlipidemia)   Left knee pain   Osteoarthrosis   Seasonal allergies   Thickened endometrium.     PAST SURGICAL HISTORY:  Ganglion cyst of wrist removal 1990  H/O repair of left rotator cuff 2010  History of appendectomy 1969  History of arthroscopy of both knees   History of bladder repair surgery Bladder lift 12/2007  History of inguinal hernia repair   S/P trigger finger release 2005  Status post left partial knee replacement   Thymus disorder removal- 1954.     Hospital Course:  After admission on 8/3/23 and receiving pre-operative parenteral prophylactic antibiotics, the patient underwent an uncomplicated Revision left total knee replacement with TONY robotic assist with Dr. Sanchez. Patient tolerated the procedure well and was transferred to the recovery room in stable condition, with a stable neuro / vascular exam of the operated extremity.  Bandage Instructions:  Prevena wound vac placed to incision. Prevena wound vac to be removed on POD #6(8/9/23) by home care nursing, and replaced with aquacel dressing (supplied to the patient at discharge).   Aquacel dressing to remain intact, and will be removed by Dr. Sanchez or his team at the 1st post operative appointment.     Patient was placed on Eliquis 2.5mg BID for DVT ppx, and was placed on Protonix for GI protection.   Patient was made weight bearing as tolerated with the operative leg.     Patient evaluated by PT/OT and recommended for disposition for home with home PT.     Discharge and Orthopedic Care instructions were delineated in the Discharge Plan and reviewed with the patient. All medications were delineated in the medication reconciliation tool and key points were reviewed with the patient. They were deemed stable from an Orthopedic & medical standpoint for discharge on 8/7/23.

## 2023-08-07 NOTE — DISCHARGE NOTE PROVIDER - NSDCMRMEDTOKEN_GEN_ALL_CORE_FT
amitriptyline 10 mg oral tablet: 2 orally once a day (at bedtime) cough  atorvastatin 20 mg oral tablet: 1 milligram(s) orally once a day (at bedtime)  Caltrate 600 + D oral tablet: 1 orally once a day  ferrous sulfate 324 mg (65 mg elemental iron) oral tablet: orally once a day  Gralise: 1200 milligram(s) orally once a day  lutein 10 mg oral tablet: 4 orally once a day  lyrica 10 mg BID:   montelukast 10 mg oral tablet: 1 tab(s) orally once a day  Omega-3 1000 mg oral capsule: 1 orally once a day  pantoprazole 40 mg intravenous injection: 40 intravenously once a day  Qvar Redihaler 80 mcg/inh inhalation aerosol: 2 puff(s) inhaled 2 times a day as needed for  cough  trospium 60 mg oral capsule, extended release: 1 orally once a day  Vitamin B-50 oral tablet: 1 orally once a day  Vitamin B12 100 mcg oral tablet: 1 orally once a day only Monday - Friday  Vitamin D3 125 mcg (5000 intl units) oral tablet: 1 orally once a day  zinc (as acetate) 50 mg oral capsule: 1 orally once a day   acetaminophen 325 mg oral tablet: 3 tab(s) orally every 8 hours  amitriptyline 10 mg oral tablet: 2 orally once a day (at bedtime) cough  apixaban 2.5 mg oral tablet: 1 tab(s) orally 2 times a day  atorvastatin 20 mg oral tablet: 1 milligram(s) orally once a day (at bedtime)  Caltrate 600 + D oral tablet: 1 orally once a day  ferrous sulfate 324 mg (65 mg elemental iron) oral tablet: orally once a day  Gralise: 1200 milligram(s) orally once a day  lutein 10 mg oral tablet: 4 orally once a day  lyrica 10 mg BID:   montelukast 10 mg oral tablet: 1 tab(s) orally once a day  Omega-3 1000 mg oral capsule: 1 orally once a day  pantoprazole 40 mg intravenous injection: 40 intravenously once a day  Qvar Redihaler 80 mcg/inh inhalation aerosol: 2 puff(s) inhaled 2 times a day as needed for  cough  traMADol 50 mg oral tablet: 1 tab(s) orally every 6 hours As needed Moderate Pain (4 - 6)  trospium 60 mg oral capsule, extended release: 1 orally once a day  Vitamin B-50 oral tablet: 1 orally once a day  Vitamin B12 100 mcg oral tablet: 1 orally once a day only Monday - Friday  Vitamin D3 125 mcg (5000 intl units) oral tablet: 1 orally once a day  zinc (as acetate) 50 mg oral capsule: 1 orally once a day   acetaminophen 325 mg oral tablet: 3 tab(s) orally every 8 hours X 5 days, then as needed for mild pain MDD: 3  amitriptyline 10 mg oral tablet: 2 orally once a day (at bedtime) cough  apixaban 2.5 mg oral tablet: 1 tab(s) orally 2 times a day X 4 weeks for the prevention of blood clots MDD: 2  atorvastatin 20 mg oral tablet: 1 milligram(s) orally once a day (at bedtime)  docusate sodium 100 mg oral tablet: 1 tab(s) orally 3 times a day to prevent constipation while taking narcotic pain medication (Stop if having diarrhea) MDD: 3  ferrous sulfate 324 mg (65 mg elemental iron) oral tablet: orally once a day  Gralise: 1200 milligram(s) orally once a day  montelukast 10 mg oral tablet: 1 tab(s) orally once a day  naproxen 500 mg oral tablet: 1 tab(s) orally 2 times a day X 2 weeks for pain, then stop MDD: 2  Narcan 4 mg/0.1 mL nasal spray: 4 milligram(s) intranasally every 2 to 3 minutes alternating between nostrils in event of narcotic overdose  pantoprazole 40 mg oral delayed release tablet: 1 tab(s) orally once a day before breakfast for gastrointestinal protection MDD: 1  polyethylene glycol 3350 oral powder for reconstitution: 17 gram(s) orally once a day (at bedtime)  pregabalin 75 mg oral capsule: 1 cap(s) orally 2 times a day  Qvar Redihaler 80 mcg/inh inhalation aerosol: 2 puff(s) inhaled 2 times a day as needed for  cough  senna leaf extract oral tablet: 2 tab(s) orally once a day (at bedtime) to prevent constipation while using narcotic (Stop if having diarrhea) MDD: 2  traMADol 50 mg oral tablet: 1 tab(s) orally every 6 hours as needed for Moderate Pain (4 - 6) ; 2 Tabs PO Q6H PRN Severe pain MDD: 6  trospium 60 mg oral capsule, extended release: 1 orally once a day

## 2023-08-07 NOTE — DISCHARGE NOTE PROVIDER - NSDCFUADDINST_GEN_ALL_CORE_FT
Please follow up with Dr. Sanchez at your pre-scheduled post-operative follow up appointment. (Call to confirm)  Continue to ambulate as tolerated to the operative leg.   Dressing instructions:   Keep Prevena dressing clean dry and intact. Prevena to be removed on Post op day #6 (8/9/23) and replaced with Aquacel dressing (supplied at discharge). Aquacel dressing to remain on until seen in the office for first post operative appointment.   Continue on Eliquis 2.5mg twice daily for blood clot prevention.     Recommended to follow up with your primary care provider within 1-2 months of hospital discharge to discuss your recent surgery and any change to your medications.  Please follow up with Dr. Sanchez at your pre-scheduled post-operative follow up appointment on 8/15/23 (Call to confirm)  Continue to ambulate as tolerated to the operative leg.   Dressing instructions:   Keep Prevena dressing clean dry and intact. Prevena to be removed on Post op day #6 (8/9/23) and replaced with Aquacel dressing (supplied at discharge). Aquacel dressing to remain on until seen in the office for first post operative appointment.   Continue on Eliquis 2.5mg twice daily for blood clot prevention.     Recommended to follow up with your primary care provider within 1-2 months of hospital discharge to discuss your recent surgery and any change to your medications.

## 2023-08-09 LAB
CULTURE RESULTS: SIGNIFICANT CHANGE UP
CULTURE RESULTS: SIGNIFICANT CHANGE UP
SPECIMEN SOURCE: SIGNIFICANT CHANGE UP
SPECIMEN SOURCE: SIGNIFICANT CHANGE UP

## 2023-08-11 ENCOUNTER — TRANSCRIPTION ENCOUNTER (OUTPATIENT)
Age: 73
End: 2023-08-11

## 2023-08-14 ENCOUNTER — NON-APPOINTMENT (OUTPATIENT)
Age: 73
End: 2023-08-14

## 2023-08-15 ENCOUNTER — APPOINTMENT (OUTPATIENT)
Dept: ORTHOPEDIC SURGERY | Facility: CLINIC | Age: 73
End: 2023-08-15
Payer: MEDICARE

## 2023-08-15 PROCEDURE — 99024 POSTOP FOLLOW-UP VISIT: CPT

## 2023-08-15 PROCEDURE — 73564 X-RAY EXAM KNEE 4 OR MORE: CPT | Mod: 26,LT

## 2023-08-15 NOTE — HISTORY OF PRESENT ILLNESS
[de-identified] : Status-post left total knee  arthroplasty here for initial postoperative evaluation. Excellent progress is noted in terms of pain and restoration of function. Pain is well controlled with oral medications. There has been no change in medical health since discharge. The patient does require assistive devices.

## 2023-08-15 NOTE — DISCUSSION/SUMMARY
[de-identified] : The patient is doing well after joint replacement surgery. Written infectious precautions were reviewed. The patient will progress with physical therapy at this time and they will work on transitioning from requiring assistive devices for ambulation. Anti-coagulant therapy will be discontinued at 1 month post surgery for the purpose of orthopedic thromboembolism prophylaxis. Return around the 6 week anniversary from surgery for follow-up evaluation.   The patient returns to the office today for staple removal.  The staples have been in since surgery. The patient has no complaints. The wound is healing well and is without evidence of infection or wound dehiscence. The wound was prepped with betadine and a staple removal tool was used to remove all staples in their entirety. Steri strips were placed over the wound and the patient was instructed to leave these in place until they fall off on their own. The patient tolerated the procedure well and there were no immediate complications. The wound edges are well adhered. The patient was instructed to continue to keep it clean. .

## 2023-08-15 NOTE — PHYSICAL EXAM
[de-identified] : Well developed, well nourished in no apparent distress, awake, alert and orientated to person, place and time with appropriate mood and affect Respirations are even and unlabored. Gait evaluation does not reveal a limp. There is no inguinal adenopathy. The affected limb is well-perfused with palpable pedal pulse, without skin lesions, shows a grossly normal motor and sensory examination. Incision is CDI. Knee motion is 0-80 [de-identified] : AP, lateral, sunrise knee and tunnel  x-rays of the left knee were ordered and obtained in the office and demonstrate satisfactory position and alignment of the components are present. No signs of loosening are seen.

## 2023-08-21 ENCOUNTER — APPOINTMENT (OUTPATIENT)
Dept: FAMILY MEDICINE | Facility: CLINIC | Age: 73
End: 2023-08-21
Payer: MEDICARE

## 2023-08-21 VITALS
BODY MASS INDEX: 30.73 KG/M2 | OXYGEN SATURATION: 97 % | TEMPERATURE: 97.7 F | SYSTOLIC BLOOD PRESSURE: 116 MMHG | HEART RATE: 93 BPM | DIASTOLIC BLOOD PRESSURE: 80 MMHG | WEIGHT: 167 LBS | RESPIRATION RATE: 14 BRPM | HEIGHT: 62 IN

## 2023-08-21 DIAGNOSIS — D72.829 ELEVATED WHITE BLOOD CELL COUNT, UNSPECIFIED: ICD-10-CM

## 2023-08-21 DIAGNOSIS — D64.9 ANEMIA, UNSPECIFIED: ICD-10-CM

## 2023-08-21 PROCEDURE — 99215 OFFICE O/P EST HI 40 MIN: CPT

## 2023-08-21 NOTE — PHYSICAL EXAM
[Normal] : no acute distress, well nourished, well developed and well-appearing [de-identified] : healing surgical incision left knee

## 2023-08-21 NOTE — HISTORY OF PRESENT ILLNESS
[de-identified] : 74 y/o F recent s/p Left knee arthroplasty . Doing well. Requests more tramadol. Lab revealed  mildlyt elevated WBC count and anemia.

## 2023-08-24 RX ORDER — IBUPROFEN 800 MG/1
800 TABLET, FILM COATED ORAL 3 TIMES DAILY
Qty: 30 | Refills: 1 | Status: ACTIVE | COMMUNITY
Start: 2023-08-24 | End: 1900-01-01

## 2023-08-28 ENCOUNTER — INPATIENT (INPATIENT)
Facility: HOSPITAL | Age: 73
LOS: 0 days | Discharge: HOME HEALTH | DRG: 902 | End: 2023-08-29
Attending: ORTHOPAEDIC SURGERY | Admitting: ORTHOPAEDIC SURGERY
Payer: COMMERCIAL

## 2023-08-28 ENCOUNTER — APPOINTMENT (OUTPATIENT)
Dept: ORTHOPEDIC SURGERY | Facility: HOSPITAL | Age: 73
End: 2023-08-28

## 2023-08-28 ENCOUNTER — TRANSCRIPTION ENCOUNTER (OUTPATIENT)
Age: 73
End: 2023-08-28

## 2023-08-28 VITALS
WEIGHT: 166.89 LBS | OXYGEN SATURATION: 99 % | HEART RATE: 78 BPM | HEIGHT: 62 IN | RESPIRATION RATE: 18 BRPM | TEMPERATURE: 98 F | DIASTOLIC BLOOD PRESSURE: 66 MMHG | SYSTOLIC BLOOD PRESSURE: 125 MMHG

## 2023-08-28 DIAGNOSIS — Z96.652 PRESENCE OF LEFT ARTIFICIAL KNEE JOINT: Chronic | ICD-10-CM

## 2023-08-28 DIAGNOSIS — Z98.89 OTHER SPECIFIED POSTPROCEDURAL STATES: Chronic | ICD-10-CM

## 2023-08-28 DIAGNOSIS — Z98.890 OTHER SPECIFIED POSTPROCEDURAL STATES: Chronic | ICD-10-CM

## 2023-08-28 DIAGNOSIS — E32.9 DISEASE OF THYMUS, UNSPECIFIED: Chronic | ICD-10-CM

## 2023-08-28 DIAGNOSIS — M00.9 PYOGENIC ARTHRITIS, UNSPECIFIED: ICD-10-CM

## 2023-08-28 DIAGNOSIS — M67.439 GANGLION, UNSPECIFIED WRIST: Chronic | ICD-10-CM

## 2023-08-28 LAB
ALBUMIN SERPL ELPH-MCNC: 4.2 G/DL — SIGNIFICANT CHANGE UP (ref 3.3–5)
ALP SERPL-CCNC: 84 U/L — SIGNIFICANT CHANGE UP (ref 40–120)
ALT FLD-CCNC: 16 U/L — SIGNIFICANT CHANGE UP (ref 10–45)
ANION GAP SERPL CALC-SCNC: 14 MMOL/L — SIGNIFICANT CHANGE UP (ref 5–17)
APTT BLD: 28.9 SEC — SIGNIFICANT CHANGE UP (ref 24.5–35.6)
AST SERPL-CCNC: 17 U/L — SIGNIFICANT CHANGE UP (ref 10–40)
B PERT IGG+IGM PNL SER: ABNORMAL
BASE EXCESS BLDV CALC-SCNC: -3.6 MMOL/L — LOW (ref -2–3)
BASOPHILS # BLD AUTO: 0.05 K/UL — SIGNIFICANT CHANGE UP (ref 0–0.2)
BASOPHILS NFR BLD AUTO: 0.7 % — SIGNIFICANT CHANGE UP (ref 0–2)
BILIRUB SERPL-MCNC: 0.1 MG/DL — LOW (ref 0.2–1.2)
BUN SERPL-MCNC: 35 MG/DL — HIGH (ref 7–23)
CA-I SERPL-SCNC: 1.3 MMOL/L — SIGNIFICANT CHANGE UP (ref 1.15–1.33)
CALCIUM SERPL-MCNC: 9.6 MG/DL — SIGNIFICANT CHANGE UP (ref 8.4–10.5)
CHLORIDE BLDV-SCNC: 107 MMOL/L — SIGNIFICANT CHANGE UP (ref 96–108)
CHLORIDE SERPL-SCNC: 103 MMOL/L — SIGNIFICANT CHANGE UP (ref 96–108)
CO2 BLDV-SCNC: 25 MMOL/L — SIGNIFICANT CHANGE UP (ref 22–26)
CO2 SERPL-SCNC: 21 MMOL/L — LOW (ref 22–31)
COLOR FLD: ABNORMAL
CREAT SERPL-MCNC: 1.54 MG/DL — HIGH (ref 0.5–1.3)
CRP SERPL-MCNC: 8 MG/L — HIGH (ref 0–4)
EGFR: 35 ML/MIN/1.73M2 — LOW
EOSINOPHIL # BLD AUTO: 0.32 K/UL — SIGNIFICANT CHANGE UP (ref 0–0.5)
EOSINOPHIL NFR BLD AUTO: 4.4 % — SIGNIFICANT CHANGE UP (ref 0–6)
ERYTHROCYTE [SEDIMENTATION RATE] IN BLOOD: 45 MM/HR — HIGH (ref 0–20)
FLUID INTAKE SUBSTANCE CLASS: SIGNIFICANT CHANGE UP
GAS PNL BLDV: 137 MMOL/L — SIGNIFICANT CHANGE UP (ref 136–145)
GAS PNL BLDV: SIGNIFICANT CHANGE UP
GLUCOSE BLDV-MCNC: 99 MG/DL — SIGNIFICANT CHANGE UP (ref 70–99)
GLUCOSE SERPL-MCNC: 98 MG/DL — SIGNIFICANT CHANGE UP (ref 70–99)
HCO3 BLDV-SCNC: 23 MMOL/L — SIGNIFICANT CHANGE UP (ref 22–29)
HCT VFR BLD CALC: 34.1 % — LOW (ref 34.5–45)
HCT VFR BLDA CALC: 33 % — LOW (ref 34.5–46.5)
HGB BLD CALC-MCNC: 11.1 G/DL — LOW (ref 11.7–16.1)
HGB BLD-MCNC: 10.9 G/DL — LOW (ref 11.5–15.5)
IMM GRANULOCYTES NFR BLD AUTO: 0.4 % — SIGNIFICANT CHANGE UP (ref 0–0.9)
INR BLD: 1.01 RATIO — SIGNIFICANT CHANGE UP (ref 0.85–1.18)
LACTATE BLDV-MCNC: 1.1 MMOL/L — SIGNIFICANT CHANGE UP (ref 0.5–2)
LYMPHOCYTES # BLD AUTO: 1.26 K/UL — SIGNIFICANT CHANGE UP (ref 1–3.3)
LYMPHOCYTES # BLD AUTO: 17.4 % — SIGNIFICANT CHANGE UP (ref 13–44)
LYMPHOCYTES # FLD: 14 % — SIGNIFICANT CHANGE UP
MCHC RBC-ENTMCNC: 29.7 PG — SIGNIFICANT CHANGE UP (ref 27–34)
MCHC RBC-ENTMCNC: 32 GM/DL — SIGNIFICANT CHANGE UP (ref 32–36)
MCV RBC AUTO: 92.9 FL — SIGNIFICANT CHANGE UP (ref 80–100)
MONOCYTES # BLD AUTO: 0.75 K/UL — SIGNIFICANT CHANGE UP (ref 0–0.9)
MONOCYTES NFR BLD AUTO: 10.3 % — SIGNIFICANT CHANGE UP (ref 2–14)
MONOS+MACROS # FLD: 12 % — SIGNIFICANT CHANGE UP
NEUTROPHILS # BLD AUTO: 4.85 K/UL — SIGNIFICANT CHANGE UP (ref 1.8–7.4)
NEUTROPHILS NFR BLD AUTO: 66.8 % — SIGNIFICANT CHANGE UP (ref 43–77)
NEUTROPHILS-BODY FLUID: 74 % — SIGNIFICANT CHANGE UP
NRBC # BLD: 0 /100 WBCS — SIGNIFICANT CHANGE UP (ref 0–0)
PCO2 BLDV: 48 MMHG — HIGH (ref 39–42)
PH BLDV: 7.29 — LOW (ref 7.32–7.43)
PLATELET # BLD AUTO: 430 K/UL — HIGH (ref 150–400)
PO2 BLDV: 30 MMHG — SIGNIFICANT CHANGE UP (ref 25–45)
POTASSIUM BLDV-SCNC: 5.2 MMOL/L — HIGH (ref 3.5–5.1)
POTASSIUM SERPL-MCNC: 4.4 MMOL/L — SIGNIFICANT CHANGE UP (ref 3.5–5.3)
POTASSIUM SERPL-SCNC: 4.4 MMOL/L — SIGNIFICANT CHANGE UP (ref 3.5–5.3)
PROT SERPL-MCNC: 7.1 G/DL — SIGNIFICANT CHANGE UP (ref 6–8.3)
PROTHROM AB SERPL-ACNC: 10.6 SEC — SIGNIFICANT CHANGE UP (ref 9.5–13)
RBC # BLD: 3.67 M/UL — LOW (ref 3.8–5.2)
RBC # FLD: 13.6 % — SIGNIFICANT CHANGE UP (ref 10.3–14.5)
RCV VOL RI: HIGH CELLS/UL (ref 0–5)
SAO2 % BLDV: 43.1 % — LOW (ref 67–88)
SODIUM SERPL-SCNC: 138 MMOL/L — SIGNIFICANT CHANGE UP (ref 135–145)
SYNOVIAL CRYSTALS CLARITY: ABNORMAL
SYNOVIAL CRYSTALS COLOR: ABNORMAL
SYNOVIAL CRYSTALS ID: SIGNIFICANT CHANGE UP
SYNOVIAL CRYSTALS TUBE: SIGNIFICANT CHANGE UP
TOTAL NUCLEATED CELL COUNT, BODY FLUID: 50 CELLS/UL — HIGH (ref 0–5)
TUBE TYPE: SIGNIFICANT CHANGE UP
WBC # BLD: 7.26 K/UL — SIGNIFICANT CHANGE UP (ref 3.8–10.5)
WBC # FLD AUTO: 7.26 K/UL — SIGNIFICANT CHANGE UP (ref 3.8–10.5)

## 2023-08-28 PROCEDURE — 99285 EMERGENCY DEPT VISIT HI MDM: CPT

## 2023-08-28 PROCEDURE — 71045 X-RAY EXAM CHEST 1 VIEW: CPT | Mod: 26

## 2023-08-28 PROCEDURE — 73562 X-RAY EXAM OF KNEE 3: CPT | Mod: 26,LT

## 2023-08-28 PROCEDURE — 27301 DRAIN THIGH/KNEE LESION: CPT | Mod: 78,LT

## 2023-08-28 RX ORDER — ATORVASTATIN CALCIUM 80 MG/1
20 TABLET, FILM COATED ORAL AT BEDTIME
Refills: 0 | Status: DISCONTINUED | OUTPATIENT
Start: 2023-08-28 | End: 2023-08-29

## 2023-08-28 RX ORDER — HYDROMORPHONE HYDROCHLORIDE 2 MG/ML
0.5 INJECTION INTRAMUSCULAR; INTRAVENOUS; SUBCUTANEOUS
Refills: 0 | Status: DISCONTINUED | OUTPATIENT
Start: 2023-08-28 | End: 2023-08-28

## 2023-08-28 RX ORDER — PIPERACILLIN AND TAZOBACTAM 4; .5 G/20ML; G/20ML
3.38 INJECTION, POWDER, LYOPHILIZED, FOR SOLUTION INTRAVENOUS EVERY 8 HOURS
Refills: 0 | Status: DISCONTINUED | OUTPATIENT
Start: 2023-08-28 | End: 2023-08-29

## 2023-08-28 RX ORDER — FENTANYL CITRATE 50 UG/ML
25 INJECTION INTRAVENOUS
Refills: 0 | Status: DISCONTINUED | OUTPATIENT
Start: 2023-08-28 | End: 2023-08-29

## 2023-08-28 RX ORDER — AMITRIPTYLINE HCL 25 MG
20 TABLET ORAL AT BEDTIME
Refills: 0 | Status: DISCONTINUED | OUTPATIENT
Start: 2023-08-28 | End: 2023-08-29

## 2023-08-28 RX ORDER — ONDANSETRON 8 MG/1
4 TABLET, FILM COATED ORAL ONCE
Refills: 0 | Status: DISCONTINUED | OUTPATIENT
Start: 2023-08-28 | End: 2023-08-29

## 2023-08-28 RX ORDER — POLYETHYLENE GLYCOL 3350 17 G/17G
17 POWDER, FOR SOLUTION ORAL AT BEDTIME
Refills: 0 | Status: DISCONTINUED | OUTPATIENT
Start: 2023-08-28 | End: 2023-08-29

## 2023-08-28 RX ORDER — MAGNESIUM HYDROXIDE 400 MG/1
30 TABLET, CHEWABLE ORAL DAILY
Refills: 0 | Status: DISCONTINUED | OUTPATIENT
Start: 2023-08-28 | End: 2023-08-29

## 2023-08-28 RX ORDER — SODIUM CHLORIDE 9 MG/ML
1000 INJECTION, SOLUTION INTRAVENOUS
Refills: 0 | Status: DISCONTINUED | OUTPATIENT
Start: 2023-08-28 | End: 2023-08-28

## 2023-08-28 RX ORDER — ATORVASTATIN CALCIUM 80 MG/1
20 TABLET, FILM COATED ORAL AT BEDTIME
Refills: 0 | Status: DISCONTINUED | OUTPATIENT
Start: 2023-08-28 | End: 2023-08-28

## 2023-08-28 RX ORDER — ACETAMINOPHEN 500 MG
1000 TABLET ORAL EVERY 8 HOURS
Refills: 0 | Status: DISCONTINUED | OUTPATIENT
Start: 2023-08-30 | End: 2023-08-29

## 2023-08-28 RX ORDER — VANCOMYCIN HCL 1 G
1250 VIAL (EA) INTRAVENOUS EVERY 24 HOURS
Refills: 0 | Status: DISCONTINUED | OUTPATIENT
Start: 2023-08-28 | End: 2023-08-29

## 2023-08-28 RX ORDER — SODIUM CHLORIDE 9 MG/ML
500 INJECTION, SOLUTION INTRAVENOUS ONCE
Refills: 0 | Status: COMPLETED | OUTPATIENT
Start: 2023-08-28 | End: 2023-08-28

## 2023-08-28 RX ORDER — TRAMADOL HYDROCHLORIDE 50 MG/1
50 TABLET ORAL EVERY 4 HOURS
Refills: 0 | Status: DISCONTINUED | OUTPATIENT
Start: 2023-08-28 | End: 2023-08-29

## 2023-08-28 RX ORDER — SODIUM CHLORIDE 9 MG/ML
500 INJECTION, SOLUTION INTRAVENOUS ONCE
Refills: 0 | Status: COMPLETED | OUTPATIENT
Start: 2023-08-28 | End: 2023-08-29

## 2023-08-28 RX ORDER — VANCOMYCIN HCL 1 G
1000 VIAL (EA) INTRAVENOUS EVERY 24 HOURS
Refills: 0 | Status: DISCONTINUED | OUTPATIENT
Start: 2023-08-28 | End: 2023-08-28

## 2023-08-28 RX ORDER — FERROUS SULFATE 325(65) MG
325 TABLET ORAL DAILY
Refills: 0 | Status: DISCONTINUED | OUTPATIENT
Start: 2023-08-28 | End: 2023-08-28

## 2023-08-28 RX ORDER — ACETAMINOPHEN 500 MG
1000 TABLET ORAL ONCE
Refills: 0 | Status: DISCONTINUED | OUTPATIENT
Start: 2023-08-29 | End: 2023-08-29

## 2023-08-28 RX ORDER — VANCOMYCIN HCL 1 G
1000 VIAL (EA) INTRAVENOUS ONCE
Refills: 0 | Status: COMPLETED | OUTPATIENT
Start: 2023-08-28 | End: 2023-08-28

## 2023-08-28 RX ORDER — GABAPENTIN 400 MG/1
600 CAPSULE ORAL
Refills: 0 | Status: DISCONTINUED | OUTPATIENT
Start: 2023-08-28 | End: 2023-08-29

## 2023-08-28 RX ORDER — HYDROMORPHONE HYDROCHLORIDE 2 MG/ML
0.5 INJECTION INTRAMUSCULAR; INTRAVENOUS; SUBCUTANEOUS ONCE
Refills: 0 | Status: DISCONTINUED | OUTPATIENT
Start: 2023-08-28 | End: 2023-08-28

## 2023-08-28 RX ORDER — TRAMADOL HYDROCHLORIDE 50 MG/1
25 TABLET ORAL EVERY 4 HOURS
Refills: 0 | Status: DISCONTINUED | OUTPATIENT
Start: 2023-08-28 | End: 2023-08-29

## 2023-08-28 RX ORDER — MONTELUKAST 4 MG/1
10 TABLET, CHEWABLE ORAL DAILY
Refills: 0 | Status: DISCONTINUED | OUTPATIENT
Start: 2023-08-28 | End: 2023-08-29

## 2023-08-28 RX ORDER — SENNA PLUS 8.6 MG/1
2 TABLET ORAL AT BEDTIME
Refills: 0 | Status: DISCONTINUED | OUTPATIENT
Start: 2023-08-28 | End: 2023-08-28

## 2023-08-28 RX ORDER — SENNA PLUS 8.6 MG/1
2 TABLET ORAL AT BEDTIME
Refills: 0 | Status: DISCONTINUED | OUTPATIENT
Start: 2023-08-28 | End: 2023-08-29

## 2023-08-28 RX ORDER — PIPERACILLIN AND TAZOBACTAM 4; .5 G/20ML; G/20ML
3.38 INJECTION, POWDER, LYOPHILIZED, FOR SOLUTION INTRAVENOUS EVERY 8 HOURS
Refills: 0 | Status: DISCONTINUED | OUTPATIENT
Start: 2023-08-28 | End: 2023-08-28

## 2023-08-28 RX ORDER — PIPERACILLIN AND TAZOBACTAM 4; .5 G/20ML; G/20ML
3.38 INJECTION, POWDER, LYOPHILIZED, FOR SOLUTION INTRAVENOUS ONCE
Refills: 0 | Status: COMPLETED | OUTPATIENT
Start: 2023-08-28 | End: 2023-08-28

## 2023-08-28 RX ORDER — LANOLIN ALCOHOL/MO/W.PET/CERES
3 CREAM (GRAM) TOPICAL AT BEDTIME
Refills: 0 | Status: DISCONTINUED | OUTPATIENT
Start: 2023-08-28 | End: 2023-08-28

## 2023-08-28 RX ORDER — APIXABAN 2.5 MG/1
2.5 TABLET, FILM COATED ORAL EVERY 12 HOURS
Refills: 0 | Status: DISCONTINUED | OUTPATIENT
Start: 2023-08-29 | End: 2023-08-29

## 2023-08-28 RX ORDER — ONDANSETRON 8 MG/1
4 TABLET, FILM COATED ORAL ONCE
Refills: 0 | Status: DISCONTINUED | OUTPATIENT
Start: 2023-08-28 | End: 2023-08-28

## 2023-08-28 RX ORDER — ACETAMINOPHEN 500 MG
1000 TABLET ORAL ONCE
Refills: 0 | Status: COMPLETED | OUTPATIENT
Start: 2023-08-29 | End: 2023-08-29

## 2023-08-28 RX ORDER — ONDANSETRON 8 MG/1
4 TABLET, FILM COATED ORAL EVERY 6 HOURS
Refills: 0 | Status: DISCONTINUED | OUTPATIENT
Start: 2023-08-28 | End: 2023-08-29

## 2023-08-28 RX ORDER — ACETAMINOPHEN 500 MG
975 TABLET ORAL EVERY 8 HOURS
Refills: 0 | Status: DISCONTINUED | OUTPATIENT
Start: 2023-08-28 | End: 2023-08-28

## 2023-08-28 RX ORDER — MOMETASONE FUROATE 220 UG/1
2 INHALANT RESPIRATORY (INHALATION)
Refills: 0 | Status: DISCONTINUED | OUTPATIENT
Start: 2023-08-28 | End: 2023-08-29

## 2023-08-28 RX ORDER — PANTOPRAZOLE SODIUM 20 MG/1
40 TABLET, DELAYED RELEASE ORAL
Refills: 0 | Status: DISCONTINUED | OUTPATIENT
Start: 2023-08-28 | End: 2023-08-29

## 2023-08-28 RX ADMIN — HYDROMORPHONE HYDROCHLORIDE 0.5 MILLIGRAM(S): 2 INJECTION INTRAMUSCULAR; INTRAVENOUS; SUBCUTANEOUS at 22:28

## 2023-08-28 RX ADMIN — HYDROMORPHONE HYDROCHLORIDE 0.5 MILLIGRAM(S): 2 INJECTION INTRAMUSCULAR; INTRAVENOUS; SUBCUTANEOUS at 22:18

## 2023-08-28 RX ADMIN — TRAMADOL HYDROCHLORIDE 50 MILLIGRAM(S): 50 TABLET ORAL at 23:37

## 2023-08-28 RX ADMIN — PIPERACILLIN AND TAZOBACTAM 200 GRAM(S): 4; .5 INJECTION, POWDER, LYOPHILIZED, FOR SOLUTION INTRAVENOUS at 16:59

## 2023-08-28 RX ADMIN — HYDROMORPHONE HYDROCHLORIDE 0.5 MILLIGRAM(S): 2 INJECTION INTRAMUSCULAR; INTRAVENOUS; SUBCUTANEOUS at 22:45

## 2023-08-28 RX ADMIN — FENTANYL CITRATE 25 MICROGRAM(S): 50 INJECTION INTRAVENOUS at 21:55

## 2023-08-28 RX ADMIN — Medication 250 MILLIGRAM(S): at 17:39

## 2023-08-28 RX ADMIN — HYDROMORPHONE HYDROCHLORIDE 0.5 MILLIGRAM(S): 2 INJECTION INTRAMUSCULAR; INTRAVENOUS; SUBCUTANEOUS at 23:45

## 2023-08-28 RX ADMIN — SODIUM CHLORIDE 500 MILLILITER(S): 9 INJECTION, SOLUTION INTRAVENOUS at 23:38

## 2023-08-28 RX ADMIN — FENTANYL CITRATE 25 MICROGRAM(S): 50 INJECTION INTRAVENOUS at 22:15

## 2023-08-28 RX ADMIN — HYDROMORPHONE HYDROCHLORIDE 0.5 MILLIGRAM(S): 2 INJECTION INTRAMUSCULAR; INTRAVENOUS; SUBCUTANEOUS at 23:20

## 2023-08-28 RX ADMIN — TRAMADOL HYDROCHLORIDE 50 MILLIGRAM(S): 50 TABLET ORAL at 23:55

## 2023-08-28 RX ADMIN — HYDROMORPHONE HYDROCHLORIDE 0.5 MILLIGRAM(S): 2 INJECTION INTRAMUSCULAR; INTRAVENOUS; SUBCUTANEOUS at 22:33

## 2023-08-28 NOTE — BRIEF OPERATIVE NOTE - NSICDXBRIEFPROCEDURE_GEN_ALL_CORE_FT
PROCEDURES:  Irrigation and debridement, knee 28-Aug-2023 21:50:07 Left, superficial Gerardo Phillip

## 2023-08-28 NOTE — ED ADULT NURSE NOTE - OBJECTIVE STATEMENT
73y Female presenting to ED states that she was at PT this morning when the physical therapist, upon seeing her L knee surgical wound, became concerned for an infection. The physical therapist did not feel comfortable performing PT and sent pt to ED. Per patient, she does not have any increased pain at the surgical site/wound, nor has she appreciated any increased warmth or swelling.

## 2023-08-28 NOTE — ED PROVIDER NOTE - ATTENDING CONTRIBUTION TO CARE
Private Physician Lata Miller Pcp. [facility]  73 F PMH Left TKR 8/4/23 Dr Sanchez, former smoker, Deaf, Fe anemia. Psoarais. Gerd,  Sciatica Osteo arthritis, Hld, P comes to ed c/o swelling erythema left knee seen by PT today and referred to ed. No fever chills. Not painful PE well developed and well nourished female awake alert [place], Msk left knee full rom 90/0, mild erythema, no dc, distal power 5/5 all ext  Jeronimo Drew MD, Facep

## 2023-08-28 NOTE — CONSULT NOTE ADULT - SUBJECTIVE AND OBJECTIVE BOX
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  Pt had history of unicompartmental knee arthroplasty that was revised adn converted by Dr. Sanchez.  She has been recovering well since discharge and has been diligent  _ c/o L knee pain for _. Able to bear weight in the LLE since sxs began. Denies fevers/chills. Denies numbness/tingling in the LLE. Denies any recent trauma/injuries/sugery/injections. Denies hx of crystal arthopathy or other inflammatory joint disorders, IV drug use, new sexual encounters/STIs, or other infections. At baseline, community ambulator w/o assistive devices.    ROS  Negative unless otherwise specified in HPI.    PAST MEDICAL & SURGICAL Hx  PAST MEDICAL & SURGICAL HISTORY:  HLD (hyperlipidemia)      Osteoarthrosis      Hearing loss      Heart murmur      Left knee pain      Hearing impairment      Seasonal allergies      GERD (gastroesophageal reflux disease)      History of psoriasis      History of iron deficiency anemia      History of sciatica      History of chronic cough      Former smoker      Thickened endometrium      History of appendectomy  1969      Thymus disorder  removal- 1954      Ganglion cyst of wrist  removal 1990      S/P trigger finger release  2005      History of bladder repair surgery  Bladder lift 12/2007      H/O repair of left rotator cuff  2010      Status post left partial knee replacement      History of arthroscopy of both knees      History of inguinal hernia repair          MEDICATIONS  Home Medications:  amitriptyline 10 mg oral tablet: 2 orally once a day (at bedtime) cough (03 Aug 2023 13:47)  atorvastatin 20 mg oral tablet: 1 milligram(s) orally once a day (at bedtime) (03 Aug 2023 13:47)  ferrous sulfate 324 mg (65 mg elemental iron) oral tablet: orally once a day (03 Aug 2023 13:47)  Gralise: 1200 milligram(s) orally once a day (03 Aug 2023 13:47)  montelukast 10 mg oral tablet: 1 tab(s) orally once a day (03 Aug 2023 22:18)  polyethylene glycol 3350 oral powder for reconstitution: 17 gram(s) orally once a day (at bedtime) (07 Aug 2023 11:46)  pregabalin 75 mg oral capsule: 1 cap(s) orally 2 times a day (07 Aug 2023 11:46)  Qvar Redihaler 80 mcg/inh inhalation aerosol: 2 puff(s) inhaled 2 times a day as needed for  cough (03 Aug 2023 22:17)  trospium 60 mg oral capsule, extended release: 1 orally once a day (03 Aug 2023 13:47)      ALLERGIES  oxycodone (Unknown)  Vicodin (Rash)  percocet (Rash)  Pseudogest-DM (Pruritus)  tramadol (Pruritus)      FAMILY Hx  FAMILY HISTORY:  No pertinent family history in first degree relatives        SOCIAL Hx  Social History:      VITALS  Vital Signs Last 24 Hrs  T(C): 36.5 (28 Aug 2023 13:16), Max: 36.5 (28 Aug 2023 13:16)  T(F): 97.7 (28 Aug 2023 13:16), Max: 97.7 (28 Aug 2023 13:16)  HR: 78 (28 Aug 2023 13:16) (78 - 78)  BP: 125/66 (28 Aug 2023 13:16) (125/66 - 125/66)  BP(mean): --  RR: 18 (28 Aug 2023 13:16) (18 - 18)  SpO2: 99% (28 Aug 2023 13:16) (99% - 99%)    Parameters below as of 28 Aug 2023 13:16  Patient On (Oxygen Delivery Method): room air        PHYSICAL EXAM  Gen: Lying in bed, non-toxic appearing, NAD  Resp: No increased WOB  LLE:  Skin intact, +effusion and +erythema over L knee  +TTP over L knee, warmer to touch relative to R side; compartments soft  L knee passive ROM _ deg, limited 2/2 pain  Motor: TA/EHL/GS/FHL intact  Sensory: DP/SP/Tib/Christen/Saph SILT  +DP pulse, WWP    LABS                  IMAGING  XRs: L knee effusion present, but no acute fx or dislocation (personal read)    PROCEDURE  The risks and benefits of arthrocentesis were explained to the patient who agreed to proceed with aspiration. Under aseptic conditions, _ cc of _ fluid was removed from the affected joint. This fluid was distributed to a sterile specimen cup for Gram stain and culture as well as a laboratory tube for cell count and crystal analysis. The patient tolerated the procedure well, and there were no complications. The patient was neurovascularly intact following the procedure.     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  Pt had history of unicompartmental knee arthroplasty that was revised adn converted by Dr. Sanchez.  She has been recovering well since discharge and has been diligent  _ c/o L knee pain for _. Able to bear weight in the LLE since sxs began. Denies fevers/chills. Denies numbness/tingling in the LLE. Denies any recent trauma/injuries/sugery/injections. Denies hx of crystal arthopathy or other inflammatory joint disorders, IV drug use, new sexual encounters/STIs, or other infections. At baseline, community ambulator w/o assistive devices.    ROS  Negative unless otherwise specified in HPI.    PAST MEDICAL & SURGICAL Hx  PAST MEDICAL & SURGICAL HISTORY:  HLD (hyperlipidemia)      Osteoarthrosis      Hearing loss      Heart murmur      Left knee pain      Hearing impairment      Seasonal allergies      GERD (gastroesophageal reflux disease)      History of psoriasis      History of iron deficiency anemia      History of sciatica      History of chronic cough      Former smoker      Thickened endometrium      History of appendectomy  1969      Thymus disorder  removal- 1954      Ganglion cyst of wrist  removal 1990      S/P trigger finger release  2005      History of bladder repair surgery  Bladder lift 12/2007      H/O repair of left rotator cuff  2010      Status post left partial knee replacement      History of arthroscopy of both knees      History of inguinal hernia repair          MEDICATIONS  Home Medications:  amitriptyline 10 mg oral tablet: 2 orally once a day (at bedtime) cough (03 Aug 2023 13:47)  atorvastatin 20 mg oral tablet: 1 milligram(s) orally once a day (at bedtime) (03 Aug 2023 13:47)  ferrous sulfate 324 mg (65 mg elemental iron) oral tablet: orally once a day (03 Aug 2023 13:47)  Gralise: 1200 milligram(s) orally once a day (03 Aug 2023 13:47)  montelukast 10 mg oral tablet: 1 tab(s) orally once a day (03 Aug 2023 22:18)  polyethylene glycol 3350 oral powder for reconstitution: 17 gram(s) orally once a day (at bedtime) (07 Aug 2023 11:46)  pregabalin 75 mg oral capsule: 1 cap(s) orally 2 times a day (07 Aug 2023 11:46)  Qvar Redihaler 80 mcg/inh inhalation aerosol: 2 puff(s) inhaled 2 times a day as needed for  cough (03 Aug 2023 22:17)  trospium 60 mg oral capsule, extended release: 1 orally once a day (03 Aug 2023 13:47)      ALLERGIES  oxycodone (Unknown)  Vicodin (Rash)  percocet (Rash)  Pseudogest-DM (Pruritus)  tramadol (Pruritus)      FAMILY Hx  FAMILY HISTORY:  No pertinent family history in first degree relatives        SOCIAL Hx  Social History:      VITALS  Vital Signs Last 24 Hrs  T(C): 36.5 (28 Aug 2023 13:16), Max: 36.5 (28 Aug 2023 13:16)  T(F): 97.7 (28 Aug 2023 13:16), Max: 97.7 (28 Aug 2023 13:16)  HR: 78 (28 Aug 2023 13:16) (78 - 78)  BP: 125/66 (28 Aug 2023 13:16) (125/66 - 125/66)  BP(mean): --  RR: 18 (28 Aug 2023 13:16) (18 - 18)  SpO2: 99% (28 Aug 2023 13:16) (99% - 99%)    Parameters below as of 28 Aug 2023 13:16  Patient On (Oxygen Delivery Method): room air        PHYSICAL EXAM  Gen: Lying in bed, non-toxic appearing, NAD  Resp: No increased WOB  LLE:  Skin intact, no effusion and +erythema over L knee along edges of incision  No fluctuance or active purlence or pustular drainage   No TTP over L knee, not warmer to touch relative to R side; compartments soft  L knee passive ROM 0-100 deg/AROM 0-90 deg  Motor: TA/EHL/GS/FHL intact  Sensory: DP/SP/Tib/Christen/Saph SILT  +DP pulse, WWP    LABS  LABS  CBC 08-28-23 @ 14:50                        10.9   7.26  )-----------( 430                   34.1       Hgb trend: 10.9 <--   WBC trend: 7.26 <--         CMP 08-28-23 @ 14:50    138  |  103  |  35<H>  ----------------------------<  98  4.4   |  21<L>  |  1.54<H>    Ca    9.6      08-28-23 @ 14:50    TPro  7.1  /  Alb  4.2  /  TBili  0.1<L>  /  DBili  x   /  AST  17  /  ALT  16  /  AlkPhos  84  08-28      Serum Cr trend: 1.54 <--       Urinalysis Basic - ( 28 Aug 2023 14:50 )    Color: x / Appearance: x / SG: x / pH: x  Gluc: 98 mg/dL / Ketone: x  / Bili: x / Urobili: x   Blood: x / Protein: x / Nitrite: x   Leuk Esterase: x / RBC: x / WBC x   Sq Epi: x / Non Sq Epi: x / Bacteria: x      PT/INR - ( 28 Aug 2023 14:50 )   PT: 10.6 sec;   INR: 1.01 ratio         PTT - ( 28 Aug 2023 14:50 ):28.9 sec          IMAGING  XRs: L knee hardware in place  acute fx or dislocation (personal read)    PROCEDURE  The risks and benefits of arthrocentesis were explained to the patient who agreed to proceed with aspiration. Under aseptic conditions, 10cc of SS fluid was removed from the affected joint. This fluid was distributed to a sterile specimen cup for Gram stain, PCR, and culture as well as a laboratory tube for cell count and crystal analysis. The patient tolerated the procedure well, and there were no complications. The patient was neurovascularly intact following the procedure.

## 2023-08-28 NOTE — ED PROVIDER NOTE - CLINICAL SUMMARY MEDICAL DECISION MAKING FREE TEXT BOX
74 y/o F with recent L total knee arthroplasty (8/3/23) presents from PT with concern for infected L knee surgical wound. CBC, CMP, ESR, CRP, and blood cultures x 2 to evaluate for leukocytosis, inflammation, potential bacteremia (although unlikely as pt appears well and is afebrile). VBG for lactate. Coags and type and screen per ortho. L knee xrays.

## 2023-08-28 NOTE — ED PROVIDER NOTE - HIV OFFER
Photosensitizer: Levulan
Location: Face and Ears
Face And Scalp Incubation Time: 2 Hour for the face and 2 Hours for the scalp
Face And Neck Incubation Time: 3 hours
Face And Ears Incubation Time: 2 Hour
Face, Ears And  Scalp Incubation Time: 2.5 hours
Consent: The procedure and risks were reviewed with the patient including but not limited to: burning, pigmentary changes, pain, blistering, scabbing, redness, and the possibility of needing numerous treatments. Strict photoprotection after the procedure was also discussed.
Debridement: Yes
Arms And Hands Incubation Time: 2 Hours
Occlusion: No
Frequency Of Pdt: Every 12 months
Pdt Type: NICOLE-U
Detail Level: Detailed
Neck Incubation Time: 1 Hour
Opt out

## 2023-08-28 NOTE — H&P ADULT - HISTORY OF PRESENT ILLNESS
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  Pt had history of unicompartmental knee arthroplasty that was revised adn converted by Dr. Sanchez.  She has been recovering well since discharge and has been diligent  _ c/o L knee pain for _. Able to bear weight in the LLE since sxs began. Denies fevers/chills. Denies numbness/tingling in the LLE. Denies any recent trauma/injuries/sugery/injections. Denies hx of crystal arthopathy or other inflammatory joint disorders, IV drug use, new sexual encounters/STIs, or other infections. At baseline, community ambulator w/o assistive devices.

## 2023-08-28 NOTE — ED PROVIDER NOTE - NS ED ROS FT
GENERAL: No fever, no chills  EYES: No change in vision  HEENT: No trouble swallowing or speaking  CARDIAC: No chest pain, no syncope  PULMONARY: No cough, no SOB, no orthopnea  GI: No abdominal pain, no nausea, no vomiting, no diarrhea, no constipation  : No changes in urination, no urinary pain  SKIN: No rashes  NEURO: No headache, no numbness, no lightheadedness  MSK: L knee pain consistent with post surgical procedure, no increased pain noticed, no muscle weakness  Otherwise as HPI or negative.

## 2023-08-28 NOTE — H&P ADULT - NSHPPHYSICALEXAM_GEN_ALL_CORE
Vital Signs Last 24 Hrs  T(C): 36.5 (28 Aug 2023 13:16), Max: 36.5 (28 Aug 2023 13:16)  T(F): 97.7 (28 Aug 2023 13:16), Max: 97.7 (28 Aug 2023 13:16)  HR: 78 (28 Aug 2023 13:16) (78 - 78)  BP: 125/66 (28 Aug 2023 13:16) (125/66 - 125/66)  BP(mean): --  RR: 18 (28 Aug 2023 13:16) (18 - 18)  SpO2: 99% (28 Aug 2023 13:16) (99% - 99%)    Parameters below as of 28 Aug 2023 13:16  Patient On (Oxygen Delivery Method): room air    PHYSICAL EXAM  Gen: Lying in bed, non-toxic appearing, NAD  Resp: No increased WOB  LLE:  Skin intact, no effusion and +erythema over L knee along edges of incision  No fluctuance or active purlence or pustular drainage   No TTP over L knee, not warmer to touch relative to R side; compartments soft  L knee passive ROM 0-100 deg/AROM 0-90 deg  Motor: TA/EHL/GS/FHL intact  Sensory: DP/SP/Tib/Christen/Saph SILT  +DP pulse, WWP

## 2023-08-28 NOTE — CONSULT NOTE ADULT - ASSESSMENT
ASSESSMENT & PLAN  73yFemale w/ L knee pain s/p arthrocentesis. ESR _, CRP _. Low suspicion for septic arthritis. Ddx includes prepatellar bursitis vs. cellulitis vs. gout vs. pseudogout vs. other inflammatory arthropathy.  -WBAT LLE, ACE wrap PRN  -f/u ESR and CRP  -f/u cell count, crystals, Gram stain, and synovial fluid culture  -keep NPO until cell count, Gram stain, crystals, and prelim synovial fluid culture results in the event pt needs an I&D  -please document medical optimization for OR in the event pt needs an I&D  -[anything unique to this pt]  -no acute ortho surgery at this time  -pain control  -ice/cold compress, elevation  .............  -Toradol challenge  -MRI for check for effusion and/or to r/o osteo  -abx  -serial aspirations if unable to tolerate surgery      PLAN TO FOLLOW  ASSESSMENT & PLAN  73yFemale w/ L knee erythema s/p rTKA 8/3 sent in from PT for concern for SSI vs PJI s/p arthrocentesis.     PLAN  -WBAT LLE, ACE wrap PRN  -f/u ESR and CRP  -f/u cell count, crystals, Gram stain, and synovial fluid culture  -keep NPO until cell count, Gram stain, crystals, and prelim synovial fluid culture results in the event pt needs an I&D  -Ortho to plan for L knee I&D this evening 8/28  -Can admit to Dr. Sanchez  -ALONDRA stafford/elaina

## 2023-08-28 NOTE — ED PROVIDER NOTE - NSICDXPASTMEDICALHX_GEN_ALL_CORE_FT
PAST MEDICAL HISTORY:  Former smoker     GERD (gastroesophageal reflux disease)     Hearing impairment     Hearing loss     Heart murmur     History of chronic cough     History of iron deficiency anemia     History of psoriasis     History of sciatica     HLD (hyperlipidemia)     Left knee pain     Osteoarthrosis     Seasonal allergies     Thickened endometrium

## 2023-08-28 NOTE — ED PROVIDER NOTE - NSICDXFAMILYHX_GEN_ALL_CORE_FT
Called pt. And left a message for him to call the [facility], along with our phone number, to reschedule his appointment. This is the 2nd attempt. FAMILY HISTORY:  No pertinent family history in first degree relatives

## 2023-08-28 NOTE — H&P ADULT - ASSESSMENT
73yFemale w/ L knee erythema s/p rTKA 8/3 sent in from PT for concern for SSI vs PJI s/p arthrocentesis.     PLAN  -WBAT LLE, ACE wrap PRN  -f/u ESR and CRP  -f/u cell count, crystals, Gram stain, and synovial fluid culture  -keep NPO until cell count, Gram stain, crystals, and prelim synovial fluid culture results in the event pt needs an I&D  -Ortho to plan for L knee I&D this evening 8/28  -Can admit to Dr. Sanchez  -ALONDRA stafford/elaina

## 2023-08-28 NOTE — ED PROVIDER NOTE - OBJECTIVE STATEMENT
74 y/o F with PMH of HTN, GERD, heart murmur, deafness (can read lips), partial L knee replacement 10/202 c/b L tibial plateau fracture several days later and subsequent ORIF, now s/p total L knee replacement on 8/3/23 presents with L knee surgical site redness/swelling c/f infection. Pt states that she was at PT this morning when the physical therapist, upon seeing her L knee surgical wound, became concerned for an infection. The physical therapist did not feel comfortable performing PT and sent pt to ED. Per patient, she does not have any increased pain at the surgical site/wound, nor has she appreciated any increased warmth or swelling. She does state that she has noticed the wound is leaking more pus with a slightly green/yellow color for the last several days. Pt states she has been performing wound care as described by her surgical team. Patient denies fever, chills, HA, sick contacts, travel, swimming/prolonged periods of time in water, CP, SOB, NVD, muscle weakness, focal deficits.

## 2023-08-28 NOTE — ED PROVIDER NOTE - PHYSICAL EXAMINATION
Gen: NAD, AAOx3, non-toxic appearing  HEENT: NCAT, normal conjunctiva, oral mucosa moist  Lung: speaking in full sentences, good aeration bilaterally, lungs CTA b/l  CV: regular rate and rhythm. peripheral pulses 2+bilaterally   Abd: soft, ND, NT  MSK: L knee ROM limited, but not more so than has been typical post surgery  Neuro: No focal deficits  Skin: obvious L knee surgical wound, with some erythema at approximated edges, scabbing, and whitish/yellow pus visible beneath and around scabs. L knee is not appreciatively more warm or diffusely erythematous compared to R knee  Psych: normal affect Gen: NAD, AAOx3, non-toxic appearing  HEENT: NCAT, normal conjunctiva, oral mucosa moist  Lung: speaking in full sentences, good aeration bilaterally, lungs CTA b/l  CV: regular rate and rhythm. peripheral pulses 2+bilaterally   Abd: soft, ND, NT  MSK: L knee ROM limited, but not more so than has been typical post surgery  Neuro: No focal deficits  Skin: obvious L knee surgical wound, with some erythema at approximated edges, scabbing, and whitish/yellow pus visible beneath and around scabs. L knee is warmer compared to R knee  Psych: normal affect

## 2023-08-28 NOTE — H&P ADULT - NSHPLABSRESULTS_GEN_ALL_CORE
LABS  CBC 08-28-23 @ 14:50                        10.9   7.26  )-----------( 430                   34.1       Hgb trend: 10.9 <--   WBC trend: 7.26 <--         CMP 08-28-23 @ 14:50    138  |  103  |  35<H>  ----------------------------<  98  4.4   |  21<L>  |  1.54<H>    Ca    9.6      08-28-23 @ 14:50        TPro  7.1  /  Alb  4.2  /  TBili  0.1<L>  /  DBili  x   /  AST  17  /  ALT  16  /  AlkPhos  84  08-28      Serum Cr trend: 1.54 <--       Urinalysis Basic - ( 28 Aug 2023 14:50 )    Color: x / Appearance: x / SG: x / pH: x  Gluc: 98 mg/dL / Ketone: x  / Bili: x / Urobili: x   Blood: x / Protein: x / Nitrite: x   Leuk Esterase: x / RBC: x / WBC x   Sq Epi: x / Non Sq Epi: x / Bacteria: x      PT/INR - ( 28 Aug 2023 14:50 )   PT: 10.6 sec;   INR: 1.01 ratio         PTT - ( 28 Aug 2023 14:50 ):28.9 sec        IMAGING  XRs: L knee hardware in place  acute fx or dislocation (personal read)    PROCEDURE  The risks and benefits of arthrocentesis were explained to the patient who agreed to proceed with aspiration. Under aseptic conditions, 10cc of SS fluid was removed from the affected joint. This fluid was distributed to a sterile specimen cup for Gram stain, PCR, and culture as well as a laboratory tube for cell count and crystal analysis. The patient tolerated the procedure well, and there were no complications. The patient was neurovascularly intact following the procedure.

## 2023-08-29 ENCOUNTER — TRANSCRIPTION ENCOUNTER (OUTPATIENT)
Age: 73
End: 2023-08-29

## 2023-08-29 VITALS
RESPIRATION RATE: 18 BRPM | OXYGEN SATURATION: 98 % | TEMPERATURE: 98 F | DIASTOLIC BLOOD PRESSURE: 63 MMHG | SYSTOLIC BLOOD PRESSURE: 103 MMHG | HEART RATE: 88 BPM

## 2023-08-29 LAB
ALBUMIN SERPL ELPH-MCNC: 3.9 G/DL — SIGNIFICANT CHANGE UP (ref 3.3–5)
ALP SERPL-CCNC: 80 U/L — SIGNIFICANT CHANGE UP (ref 40–120)
ALT FLD-CCNC: 16 U/L — SIGNIFICANT CHANGE UP (ref 10–45)
ANION GAP SERPL CALC-SCNC: 13 MMOL/L — SIGNIFICANT CHANGE UP (ref 5–17)
AST SERPL-CCNC: 16 U/L — SIGNIFICANT CHANGE UP (ref 10–40)
BILIRUB SERPL-MCNC: 0.2 MG/DL — SIGNIFICANT CHANGE UP (ref 0.2–1.2)
BUN SERPL-MCNC: 26 MG/DL — HIGH (ref 7–23)
CALCIUM SERPL-MCNC: 9 MG/DL — SIGNIFICANT CHANGE UP (ref 8.4–10.5)
CHLORIDE SERPL-SCNC: 103 MMOL/L — SIGNIFICANT CHANGE UP (ref 96–108)
CO2 SERPL-SCNC: 21 MMOL/L — LOW (ref 22–31)
CREAT SERPL-MCNC: 1.21 MG/DL — SIGNIFICANT CHANGE UP (ref 0.5–1.3)
EGFR: 47 ML/MIN/1.73M2 — LOW
GLUCOSE SERPL-MCNC: 178 MG/DL — HIGH (ref 70–99)
GRAM STN FLD: SIGNIFICANT CHANGE UP
HCT VFR BLD CALC: 33.8 % — LOW (ref 34.5–45)
HCV AB S/CO SERPL IA: 0.08 S/CO — SIGNIFICANT CHANGE UP (ref 0–0.99)
HCV AB SERPL-IMP: SIGNIFICANT CHANGE UP
HGB BLD-MCNC: 10.8 G/DL — LOW (ref 11.5–15.5)
JOINT PATHOGENS PNL SNV NAA+NON-PROBE: SIGNIFICANT CHANGE UP
JOINT PCR SOURCE: SIGNIFICANT CHANGE UP
MCHC RBC-ENTMCNC: 29.8 PG — SIGNIFICANT CHANGE UP (ref 27–34)
MCHC RBC-ENTMCNC: 32 GM/DL — SIGNIFICANT CHANGE UP (ref 32–36)
MCV RBC AUTO: 93.1 FL — SIGNIFICANT CHANGE UP (ref 80–100)
NRBC # BLD: 0 /100 WBCS — SIGNIFICANT CHANGE UP (ref 0–0)
PLATELET # BLD AUTO: 391 K/UL — SIGNIFICANT CHANGE UP (ref 150–400)
POTASSIUM SERPL-MCNC: 4.4 MMOL/L — SIGNIFICANT CHANGE UP (ref 3.5–5.3)
POTASSIUM SERPL-SCNC: 4.4 MMOL/L — SIGNIFICANT CHANGE UP (ref 3.5–5.3)
PROT SERPL-MCNC: 6.5 G/DL — SIGNIFICANT CHANGE UP (ref 6–8.3)
RBC # BLD: 3.63 M/UL — LOW (ref 3.8–5.2)
RBC # FLD: 13.3 % — SIGNIFICANT CHANGE UP (ref 10.3–14.5)
SODIUM SERPL-SCNC: 137 MMOL/L — SIGNIFICANT CHANGE UP (ref 135–145)
SPECIMEN SOURCE: SIGNIFICANT CHANGE UP
WBC # BLD: 6.56 K/UL — SIGNIFICANT CHANGE UP (ref 3.8–10.5)
WBC # FLD AUTO: 6.56 K/UL — SIGNIFICANT CHANGE UP (ref 3.8–10.5)

## 2023-08-29 PROCEDURE — 85610 PROTHROMBIN TIME: CPT

## 2023-08-29 PROCEDURE — 82435 ASSAY OF BLOOD CHLORIDE: CPT

## 2023-08-29 PROCEDURE — 12020 TX SUPFC WND DEHSN SMPL CLSR: CPT

## 2023-08-29 PROCEDURE — 96376 TX/PRO/DX INJ SAME DRUG ADON: CPT

## 2023-08-29 PROCEDURE — 82803 BLOOD GASES ANY COMBINATION: CPT

## 2023-08-29 PROCEDURE — 97165 OT EVAL LOW COMPLEX 30 MIN: CPT

## 2023-08-29 PROCEDURE — 80053 COMPREHEN METABOLIC PANEL: CPT

## 2023-08-29 PROCEDURE — 89051 BODY FLUID CELL COUNT: CPT

## 2023-08-29 PROCEDURE — 85025 COMPLETE CBC W/AUTO DIFF WBC: CPT

## 2023-08-29 PROCEDURE — 99285 EMERGENCY DEPT VISIT HI MDM: CPT

## 2023-08-29 PROCEDURE — 99222 1ST HOSP IP/OBS MODERATE 55: CPT

## 2023-08-29 PROCEDURE — 86850 RBC ANTIBODY SCREEN: CPT

## 2023-08-29 PROCEDURE — 71045 X-RAY EXAM CHEST 1 VIEW: CPT

## 2023-08-29 PROCEDURE — 86901 BLOOD TYPING SEROLOGIC RH(D): CPT

## 2023-08-29 PROCEDURE — 86900 BLOOD TYPING SEROLOGIC ABO: CPT

## 2023-08-29 PROCEDURE — 84132 ASSAY OF SERUM POTASSIUM: CPT

## 2023-08-29 PROCEDURE — 85027 COMPLETE CBC AUTOMATED: CPT

## 2023-08-29 PROCEDURE — 11042 DBRDMT SUBQ TIS 1ST 20SQCM/<: CPT | Mod: LT

## 2023-08-29 PROCEDURE — 85014 HEMATOCRIT: CPT

## 2023-08-29 PROCEDURE — 96374 THER/PROPH/DIAG INJ IV PUSH: CPT

## 2023-08-29 PROCEDURE — 36415 COLL VENOUS BLD VENIPUNCTURE: CPT

## 2023-08-29 PROCEDURE — 83605 ASSAY OF LACTIC ACID: CPT

## 2023-08-29 PROCEDURE — 85652 RBC SED RATE AUTOMATED: CPT

## 2023-08-29 PROCEDURE — 86140 C-REACTIVE PROTEIN: CPT

## 2023-08-29 PROCEDURE — 82330 ASSAY OF CALCIUM: CPT

## 2023-08-29 PROCEDURE — 97162 PT EVAL MOD COMPLEX 30 MIN: CPT

## 2023-08-29 PROCEDURE — 87070 CULTURE OTHR SPECIMN AEROBIC: CPT

## 2023-08-29 PROCEDURE — 97597 DBRDMT OPN WND 1ST 20 CM/<: CPT

## 2023-08-29 PROCEDURE — 86803 HEPATITIS C AB TEST: CPT

## 2023-08-29 PROCEDURE — 87075 CULTR BACTERIA EXCEPT BLOOD: CPT

## 2023-08-29 PROCEDURE — 89060 EXAM SYNOVIAL FLUID CRYSTALS: CPT

## 2023-08-29 PROCEDURE — 87205 SMEAR GRAM STAIN: CPT

## 2023-08-29 PROCEDURE — 84295 ASSAY OF SERUM SODIUM: CPT

## 2023-08-29 PROCEDURE — 87040 BLOOD CULTURE FOR BACTERIA: CPT

## 2023-08-29 PROCEDURE — 87999 UNLISTED MICROBIOLOGY PX: CPT

## 2023-08-29 PROCEDURE — 85018 HEMOGLOBIN: CPT

## 2023-08-29 PROCEDURE — 82947 ASSAY GLUCOSE BLOOD QUANT: CPT

## 2023-08-29 PROCEDURE — 96375 TX/PRO/DX INJ NEW DRUG ADDON: CPT

## 2023-08-29 PROCEDURE — 73562 X-RAY EXAM OF KNEE 3: CPT

## 2023-08-29 PROCEDURE — 85730 THROMBOPLASTIN TIME PARTIAL: CPT

## 2023-08-29 RX ORDER — APIXABAN 2.5 MG/1
1 TABLET, FILM COATED ORAL
Qty: 30 | Refills: 0
Start: 2023-08-29

## 2023-08-29 RX ORDER — ATORVASTATIN CALCIUM 80 MG/1
1 TABLET, FILM COATED ORAL
Refills: 0 | DISCHARGE

## 2023-08-29 RX ORDER — CEFUROXIME AXETIL 250 MG
1 TABLET ORAL
Qty: 28 | Refills: 0
Start: 2023-08-29 | End: 2023-09-11

## 2023-08-29 RX ORDER — ACETAMINOPHEN 500 MG
2 TABLET ORAL
Qty: 0 | Refills: 0 | DISCHARGE
Start: 2023-08-29

## 2023-08-29 RX ORDER — TRAMADOL HYDROCHLORIDE 50 MG/1
1 TABLET ORAL
Qty: 28 | Refills: 0
Start: 2023-08-29 | End: 2023-09-04

## 2023-08-29 RX ADMIN — SODIUM CHLORIDE 500 MILLILITER(S): 9 INJECTION, SOLUTION INTRAVENOUS at 03:04

## 2023-08-29 RX ADMIN — Medication 166.67 MILLIGRAM(S): at 03:03

## 2023-08-29 RX ADMIN — TRAMADOL HYDROCHLORIDE 50 MILLIGRAM(S): 50 TABLET ORAL at 06:25

## 2023-08-29 RX ADMIN — TRAMADOL HYDROCHLORIDE 50 MILLIGRAM(S): 50 TABLET ORAL at 16:31

## 2023-08-29 RX ADMIN — MONTELUKAST 10 MILLIGRAM(S): 4 TABLET, CHEWABLE ORAL at 11:09

## 2023-08-29 RX ADMIN — Medication 1000 MILLIGRAM(S): at 02:15

## 2023-08-29 RX ADMIN — Medication 75 MILLIGRAM(S): at 05:47

## 2023-08-29 RX ADMIN — TRAMADOL HYDROCHLORIDE 50 MILLIGRAM(S): 50 TABLET ORAL at 06:31

## 2023-08-29 RX ADMIN — Medication 1000 MILLIGRAM(S): at 11:30

## 2023-08-29 RX ADMIN — SODIUM CHLORIDE 500 MILLILITER(S): 9 INJECTION, SOLUTION INTRAVENOUS at 05:48

## 2023-08-29 RX ADMIN — PIPERACILLIN AND TAZOBACTAM 25 GRAM(S): 4; .5 INJECTION, POWDER, LYOPHILIZED, FOR SOLUTION INTRAVENOUS at 01:16

## 2023-08-29 RX ADMIN — PANTOPRAZOLE SODIUM 40 MILLIGRAM(S): 20 TABLET, DELAYED RELEASE ORAL at 05:47

## 2023-08-29 RX ADMIN — PIPERACILLIN AND TAZOBACTAM 25 GRAM(S): 4; .5 INJECTION, POWDER, LYOPHILIZED, FOR SOLUTION INTRAVENOUS at 08:45

## 2023-08-29 RX ADMIN — Medication 400 MILLIGRAM(S): at 11:09

## 2023-08-29 RX ADMIN — Medication 400 MILLIGRAM(S): at 02:04

## 2023-08-29 RX ADMIN — TRAMADOL HYDROCHLORIDE 50 MILLIGRAM(S): 50 TABLET ORAL at 17:30

## 2023-08-29 RX ADMIN — GABAPENTIN 600 MILLIGRAM(S): 400 CAPSULE ORAL at 05:47

## 2023-08-29 RX ADMIN — APIXABAN 2.5 MILLIGRAM(S): 2.5 TABLET, FILM COATED ORAL at 05:47

## 2023-08-29 NOTE — OCCUPATIONAL THERAPY INITIAL EVALUATION ADULT - LIVES WITH, PROFILE
Pt reports living in a private house 2 [address]+ 5 steps once inside with walk-in shower. Reports being IND prior and no longer using AD for mobility prior to recent readmission./spouse

## 2023-08-29 NOTE — PHYSICAL THERAPY INITIAL EVALUATION ADULT - ADDITIONAL COMMENTS
Pt lives with  in split level home with 2 steps to enter and 5 steps inside with rail, was using rolling walker and SC and was functionally independent prior.

## 2023-08-29 NOTE — DISCHARGE NOTE NURSING/CASE MANAGEMENT/SOCIAL WORK - PATIENT PORTAL LINK FT
You can access the FollowMyHealth Patient Portal offered by [facility] by registering at the following website: [email or web address]. By joining [employer]’s FollowMyHealth portal, you will also be able to view your health information using other applications (apps) compatible with our system.

## 2023-08-29 NOTE — DISCHARGE NOTE PROVIDER - CARE PROVIDER_API CALL
Mikey Sanchez  Orthopaedic Surgery  [address], [address]  [place] [address]  Phone: [phone]  Fax: [phone]  Follow Up Time:

## 2023-08-29 NOTE — OCCUPATIONAL THERAPY INITIAL EVALUATION ADULT - PERTINENT HX OF CURRENT PROBLEM, REHAB EVAL
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  Pt had history of unicompartmental knee arthroplasty that was revised adn converted by Dr. Sanchez.  She has been recovering well since discharge and has been diligent  _ c/o L knee pain for _. Able to bear weight in the LLE since sxs began. Denies fevers/chills. Denies numbness/tingling in the LLE. Denies any recent trauma/injuries/sugery/injections. Denies hx of crystal arthopathy or other inflammatory joint disorders, IV drug use, new sexual encounters/STIs, or other infections. At baseline, community ambulator w/o assistive devices.Pt now s/p superficial irrigation and debridement of L knee for wound dehiscence. WBAT on LLE/OOB in Knee Immobilizer LIE with plan to release the lock in office to 0-90 deg in one week   CT L KNEE: Status post left knee hemiarthroplasty with changes of osteoarthritis within the lateral and patellofemoralcompartments as described.XRAY CHEST: Clear lungs.XRAY KNEE : Status post left total knee arthroplasty. Medial fixation hardware within the proximal tibia. No evidence of osteolysis or loosening. Nonspecific soft tissue swelling throughout the knee. Small knee joint effusion.

## 2023-08-29 NOTE — PROGRESS NOTE ADULT - ASSESSMENT
73y Female s/p superficial irrigation and debridement of L knee for wound dehiscence.     -PT/OT- WBAT on LLE/OOB in Knee Immobilizer LIE with plan to release the lock in office to 0-90 deg in one week   -IS bedside   -DVT PPx: Eliquis 2.5 mg BID, SCD, Early OOB and Amb  -GI PPx: Protonix 40 mg   -Abx: Vanc and Zosyn as per ID recs   -Pain Control  -Continue Current Tx  -Dispo planning: PACU to Floor, pending PT/OT eval.     Orthopaedic Surgery  [facility] [id number]  [facility]        [id number]  [facility]  [id number]/ [phone]

## 2023-08-29 NOTE — PATIENT PROFILE ADULT - FALL HARM RISK - ATTEMPT OOB
ID Bands checked. Infants ID band removed and stapled to [place] Identification Footprint Sheet, the mother verified as correct, signed and witnessed by RN. Hugs tag removed. Mother of baby signed Safe Baby Crib Form verifying that she does have a safe crib for baby at home. Baby discharge Instructions given and reviewed. Mother voiced understanding. Father of baby is driving mother and baby home. Mother verbalized understanding to follow up with Pediatric Provider [address] in 2-3 days. Baby harnessed into carseat at discharge by parents. Parents and baby escorted to hospital exit by nurse. No

## 2023-08-29 NOTE — DISCHARGE NOTE PROVIDER - HOSPITAL COURSE
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  Pt had history of unicompartmental knee arthroplasty that was revised adn converted by Dr. Sanchez.  She has been recovering well since discharge and has been diligent  _ c/o L knee pain for _. Able to bear weight in the LLE since sxs began. Denies fevers/chills. Denies numbness/tingling in the LLE. Denies any recent trauma/injuries/sugery/injections. Denies hx of crystal arthopathy or other inflammatory joint disorders, IV drug use, new sexual encounters/STIs, or other infections. At baseline, community ambulator w/o assistive devices.      This is a 73 year oldd female admitted to [facility] on 8/28/23 after concern of wound breakdown s/p LTKA on 8/4/23.  Patient was evaluated by Orthopedic team and Dr. Sanchez, who after a bedside debridement, it was determined patient needed an urgent operative debridement and washout of surgical wound.  Patient was brought to the OR on 8/28 and underwent I+D with closure of surgical wound dehiscence.  Procedure was uncomplicated.  Patient was placed on antibiotics.  Evaluated and treated by PT, recommended for ..............  Remain of hospital stay unremarkable, and patient discharged ............... 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  Pt had history of unicompartmental knee arthroplasty that was revised adn converted by Dr. Sanchez.  She has been recovering well since discharge and has been diligent  _ c/o L knee pain for _. Able to bear weight in the LLE since sxs began. Denies fevers/chills. Denies numbness/tingling in the LLE. Denies any recent trauma/injuries/sugery/injections. Denies hx of crystal arthopathy or other inflammatory joint disorders, IV drug use, new sexual encounters/STIs, or other infections. At baseline, community ambulator w/o assistive devices.      This is a 73 year oldd female admitted to [facility] on 8/28/23 after concern of wound breakdown s/p LTKA on 8/4/23.    Patient was evaluated by Orthopedic team and Dr. Sanchez, who after a bedside debridement, it was determined patient needed an urgent operative debridement and washout of surgical wound.  Patient was brought to the OR on 8/28 and underwent I+D with closure of surgical wound dehiscence.    Procedure was uncomplicated.  Patient was placed on antibiotics.    Evaluated and treated by PT, recommended for home.   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  Pt had history of unicompartmental knee arthroplasty that was revised adn converted by Dr. Sanchez.  She has been recovering well since discharge and has been diligent  _ c/o L knee pain for _. Able to bear weight in the LLE since sxs began. Denies fevers/chills. Denies numbness/tingling in the LLE. Denies any recent trauma/injuries/sugery/injections. Denies hx of crystal arthropathy or other inflammatory joint disorders, IV drug use, new sexual encounters /STIs, or other infections. At baseline, community ambulator w/o assistive devices.      This is a 73 year oldd female admitted to [facility] on 8/28/23 after concern of wound breakdown s/p LTKA on 8/4/23.    Patient was evaluated by Orthopedic team and Dr. Sanchez, who after a bedside debridement, it was determined patient needed an urgent operative debridement and washout of surgical wound.  Patient was brought to the OR on 8/28 and underwent I+D with closure of surgical wound dehiscence.    Procedure was uncomplicated.  Patient was placed on antibiotics.    Evaluated and treated by PT, recommended for home.   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  Pt had history of unicompartmental knee arthroplasty that was revised adn converted by Dr. Sanchez.  She has been recovering well since discharge and has been diligent  _ c/o L knee pain for _. Able to bear weight in the LLE since sxs began. Denies fevers/chills. Denies numbness/tingling in the LLE. Denies any recent trauma/injuries/sugery/injections. Denies hx of crystal arthropathy or other inflammatory joint disorders, IV drug use, new sexual encounters /STIs, or other infections. At baseline, community ambulator w/o assistive devices.      This is a 73 year oldd female admitted to [facility] on 8/28/23 after concern of wound breakdown s/p LTKA on 8/4/23.    Patient was evaluated by Orthopedic team and Dr. Sanchez, who after a bedside debridement, it was determined patient needed an urgent operative debridement and washout of surgical wound.  Patient was brought to the OR on 8/28 and underwent I+D with closure of surgical wound dehiscence.    Procedure was uncomplicated.  Patient was placed on antibiotics.    Evaluated and treated by PT, recommended for home.   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  Pt had history of unicompartmental knee arthroplasty that was revised adn converted by Dr. Sanchez.  She has been recovering well since discharge and has been diligent  _ c/o L knee pain for _. Able to bear weight in the LLE since sxs began. Denies fevers/chills. Denies numbness/tingling in the LLE. Denies any recent trauma/injuries/sugery/injections. Denies hx of crystal arthropathy or other inflammatory joint disorders, IV drug use, new sexual encounters /STIs, or other infections. At baseline, community ambulator w/o assistive devices.      This is a 73 year oldd female admitted to [facility] on 8/28/23 after concern of wound breakdown s/p LTKA on 8/4/23.    Patient was evaluated by Orthopedic team and Dr. Sanchez, who after a bedside debridement, it was determined patient needed an urgent operative debridement and washout of surgical wound.  Patient was brought to the OR on 8/28 and underwent I+D with closure of surgical wound dehiscence.    Procedure was uncomplicated.  Patient was placed on antibiotics.    Evaluated and treated by PT, recommended for home.   Remain of hospital stay unremarkable, and patient discharged home    Follow up in Dr Sanchez's office on Tuesday 9/5/23 (POD8) for Prevena dressing to be removed and changed to an Aquacel dressing that has been provided to you prior to discharge.   Pt's weight-bearing status is weight-bearing as tolerated with Knee immobilizer locked in extension until post-operative appointment with Dr. Sanchez when the Mariano will be set to 0-90 degrees.

## 2023-08-29 NOTE — DISCHARGE NOTE PROVIDER - NSDCCPCAREPLAN_GEN_ALL_CORE_FT
PRINCIPAL DISCHARGE DIAGNOSIS  Diagnosis: Dehiscence of surgical wound  Assessment and Plan of Treatment:

## 2023-08-29 NOTE — PATIENT PROFILE ADULT - FALL HARM RISK - HARM RISK INTERVENTIONS
Assistance with ambulation/Assistance OOB with selected safe patient handling equipment/Communicate Risk of Fall with Harm to all staff/Discuss with provider need for PT consult/Monitor gait and stability/Provide patient with walking aids - walker, cane, crutches/Reinforce activity limits and safety measures with patient and family/Sit up slowly, dangle for a short time, stand at bedside before walking/Tailored Fall Risk Interventions/Use of alarms - bed, chair and/or voice tab/Visual Cue: Yellow wristband and red socks/Bed in lowest position, wheels locked, appropriate side rails in place/Call bell, personal items and telephone in reach/Instruct patient to call for assistance before getting out of bed or chair/Non-slip footwear when patient is out of bed/[place] to call system/Physically safe environment - no spills, clutter or unnecessary equipment/Purposeful Proactive Rounding/Room/bathroom lighting operational, light cord in reach

## 2023-08-29 NOTE — DISCHARGE NOTE NURSING/CASE MANAGEMENT/SOCIAL WORK - NSDCPEFALRISK_GEN_ALL_CORE
For information on Fall & Injury Prevention, visit: [email or web address] OR  [email or web address] OR  https://www.cdc.gov/steadi/patient.html

## 2023-08-29 NOTE — DISCHARGE NOTE PROVIDER - NSDCCPTREATMENT_GEN_ALL_CORE_FT
PRINCIPAL PROCEDURE  Procedure: Irrigation and debridement, knee  Findings and Treatment: Left, superficial

## 2023-08-29 NOTE — PHYSICAL THERAPY INITIAL EVALUATION ADULT - GAIT TRAINING, PT EVAL
I have reviewed and confirmed nurses' notes...
GOAL: Patient will ambulate 300 feet independently with RW

## 2023-08-29 NOTE — PHYSICAL THERAPY INITIAL EVALUATION ADULT - PERTINENT HX OF CURRENT PROBLEM, REHAB EVAL
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  Pt had history of unicompartmental knee arthroplasty that was revised adn converted by Dr. Sanchez.  She has been recovering well since discharge and has been diligent  _ c/o L knee pain for _. Able to bear weight in the LLE since sxs began. Denies fevers/chills. Denies numbness/tingling in the LLE. Denies any recent trauma/injuries/sugery/injections. Denies hx of crystal arthopathy or other inflammatory joint disorders, IV drug use, new sexual encounters/STIs, or other infections. At baseline, community ambulator w/o assistive devices.  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  Pt had history of unicompartmental knee arthroplasty that was revised adn converted by Dr. Sanchez.  She has been recovering well since discharge and has been diligent  _ c/o L knee pain for _. Able to bear weight in the LLE since sxs began. Denies fevers/chills. Denies numbness/tingling in the LLE. Pt is now s/p I&D L knee.

## 2023-08-29 NOTE — CONSULT NOTE ADULT - SUBJECTIVE AND OBJECTIVE BOX
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  Pt had history of unicompartmental knee arthroplasty that was revised adn converted by Dr. Sanchez.  She has been recovering well since discharge and has been diligent  _ c/o L knee pain for _. Able to bear weight in the LLE since sxs began. Denies fevers/chills. Denies numbness/tingling in the LLE. Denies any recent trauma/injuries/sugery/injections. Denies hx of crystal arthopathy or other inflammatory joint disorders, IV drug use, new sexual encounters/STIs, or other infections. At baseline, community ambulator w/o assistive devices.   (28 Aug 2023 17:58)"    Above reviewed. 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  Patient was doing well, but then had episode of knee pain  Patient states wound opened, due to PT  No fevers, no chills  No other new complaints  S/P OR  ID called for further eval    PAST MEDICAL & SURGICAL HISTORY:  HLD (hyperlipidemia)    Osteoarthrosis    Hearing loss    Heart murmur    Left knee pain    Hearing impairment    Seasonal allergies    GERD (gastroesophageal reflux disease)    History of psoriasis    History of iron deficiency anemia    History of sciatica    History of chronic cough    Former smoker    Thickened endometrium    History of appendectomy  1969    Thymus disorder  removal- 1954    Ganglion cyst of wrist  removal 1990    S/P trigger finger release  2005    History of bladder repair surgery  Bladder lift 12/2007    H/O repair of left rotator cuff  2010    Status post left partial knee replacement    History of arthroscopy of both knees    History of inguinal hernia repair    Allergies    oxycodone (Unknown)  Vicodin (Rash)  percocet (Rash)  Pseudogest-DM (Pruritus)    Intolerances    tramadol (Pruritus)    ANTIMICROBIALS:  piperacillin/tazobactam IVPB.. 3.375 every 8 hours  vancomycin  IVPB 1250 every 24 hours    OTHER MEDS:  acetaminophen   IVPB .. 1000 milliGRAM(s) IV Intermittent once  acetaminophen   IVPB .. 1000 milliGRAM(s) IV Intermittent once  aluminum hydroxide/magnesium hydroxide/simethicone Suspension 30 milliLiter(s) Oral four times a day PRN  amitriptyline 20 milliGRAM(s) Oral at bedtime  apixaban 2.5 milliGRAM(s) Oral every 12 hours  atorvastatin 20 milliGRAM(s) Oral at bedtime  gabapentin 600 milliGRAM(s) Oral two times a day  magnesium hydroxide Suspension 30 milliLiter(s) Oral daily PRN  mometasone 220 MICROgram(s) Inhaler 2 Puff(s) Inhalation two times a day PRN  montelukast 10 milliGRAM(s) Oral daily  ondansetron Injectable 4 milliGRAM(s) IV Push every 6 hours PRN  pantoprazole    Tablet 40 milliGRAM(s) Oral before breakfast  polyethylene glycol 3350 17 Gram(s) Oral at bedtime  pregabalin 75 milliGRAM(s) Oral two times a day  senna 2 Tablet(s) Oral at bedtime  traMADol 25 milliGRAM(s) Oral every 4 hours PRN  traMADol 50 milliGRAM(s) Oral every 4 hours PRN    SOCIAL HISTORY: No tobacco, no alcohol, no illicit drugs    FAMILY HISTORY:  No pertinent family history in first degree relatives relating to chief complaint    Drug Dosing Weight  Height (cm): 157.5 (28 Aug 2023 19:48)  Weight (kg): 75.7 (28 Aug 2023 19:48)  BMI (kg/m2): 30.5 (28 Aug 2023 19:48)  BSA (m2): 1.77 (28 Aug 2023 19:48)    PE:    Vital Signs Last 24 Hrs  T(C): 36.6 (29 Aug 2023 10:10), Max: 36.9 (29 Aug 2023 09:10)  T(F): 97.8 (29 Aug 2023 10:10), Max: 98.4 (29 Aug 2023 09:10)  HR: 91 (29 Aug 2023 10:10) (63 - 91)  BP: 121/75 (29 Aug 2023 10:10) (106/66 - 187/89)  BP(mean): 86 (29 Aug 2023 05:48) (86 - 131)  RR: 18 (29 Aug 2023 10:10) (16 - 18)  SpO2: 98% (29 Aug 2023 10:10) (95% - 100%)    Gen: AOx3, NAD, non-toxic, pleasant  CV: S1+S2 normal, nontachycardic  Resp: Clear bilat, no resp distress, no crackles/wheezes  Abd: Soft, nontender, +BS  Ext: Bandaged L knee wound  : No Moreira  IV/Skin: No thrombophlebitis  Msk: No low back pain, no arthralgias, no joint swelling  Neuro: No sensory deficits, no motor deficits    LABS:                        10.8   6.56  )-----------( 391      ( 29 Aug 2023 03:07 )             33.8     08-29    137  |  103  |  26<H>  ----------------------------<  178<H>  4.4   |  21<L>  |  1.21    Ca    9.0      29 Aug 2023 03:07    TPro  6.5  /  Alb  3.9  /  TBili  0.2  /  DBili  x   /  AST  16  /  ALT  16  /  AlkPhos  80  08-29    Urinalysis Basic - ( 29 Aug 2023 03:07 )    Color: x / Appearance: x / SG: x / pH: x  Gluc: 178 mg/dL / Ketone: x  / Bili: x / Urobili: x   Blood: x / Protein: x / Nitrite: x   Leuk Esterase: x / RBC: x / WBC x   Sq Epi: x / Non Sq Epi: x / Bacteria: x    MICROBIOLOGY:    Prosthetic Knee Prosthetic Knee  08-28-23 --  --    polymorphonuclear leukocytes seen  No organisms seen  by cytocentrifuge    .Tissue Other  08-03-23   No acid-fast bacilli isolated at 3 weeks.  --    No polymorphonuclear cells seen per low power field  No organisms seen per oil power field    RADIOLOGY:    8/28 XR:    FINDINGS:    Heart/Vascular: Heart size is normal.  Pulmonary: Clear lungs. No pneumothorax or pleural effusion.  Bones: No acute bony pathology    IMPRESSION:  Clear lungs.

## 2023-08-29 NOTE — CHART NOTE - NSCHARTNOTEFT_GEN_A_CORE
Patient seen and evaluated in the recovery unit, accompanied by her  at bedside. Pt mentions that she is having sharp pain located to her left knee where she just had surgery. PACU RN mentions that she has been receiving IV pushes of Dilaudid and her pain has been persistent until this time. Otherwise, no Chest Pain, SOB, N/V/D/HA.    Of note patient is hard-of-hearing and is able to read lips and is verbal.     T(C): 36.5 (08-28-23 @ 21:40), Max: 36.6 (08-28-23 @ 18:43)  HR: 80 (08-28-23 @ 22:30) (63 - 83)  BP: 181/97 (08-28-23 @ 22:15) (123/76 - 187/89)  RR: 18 (08-28-23 @ 19:48) (18 - 18)  SpO2: 100% (08-28-23 @ 22:30) (96% - 100%)  Wt(kg): --    Exam:  Alert and Oriented, No Acute Distress.   Card: +S1/S2, RRR  Pulm: CTAB  Laterality: L lower extremity   Knee immobilizer in place  Prevena intact holding suction   ACE dressing on LLE clean, dry and intact.   Sensory: Sensation intact to light touch   Motor: (+) PF/DF/EHL/FHL  Calves soft, non-tender bilaterally   2+ DP/PT pulse  Lower extremities warm and well-perfused, cap refill < 3 sec.    Labs:                        10.9   7.26  )-----------( 430      ( 28 Aug 2023 14:50 )             34.1    08-28    138  |  103  |  35<H>  ----------------------------<  98  4.4   |  21<L>  |  1.54<H>    Ca    9.6      28 Aug 2023 14:50    TPro  7.1  /  Alb  4.2  /  TBili  0.1<L>  /  DBili  x   /  AST  17  /  ALT  16  /  AlkPhos  84  08-28      A/P: 73yFemale s/p superficial irrigation and debridement of L knee for wound dehiscence. VSS. NAD.  -PT/OT- WBAT on LLE/OOB in Knee Immobilizer LIE with plan to release the lock in office to 0-90 deg in one week   -IS bedside   -DVT PPx: Eliquis 2.5 mg BID, SCD, Early OOB and Amb  -GI PPx: Protonix 40 mg   -Abx: Vanc and Zosyn as per ID recs   -Pain Control  -Continue Current Tx  -f/u AM labs.   -Dispo planning: PACU to Floor, pending PT/OT eval.     Agusto Willoughby PA-C  Orthopedic Surgery Team  Team Pager #[phone]
Patient was fit and delivered a long leg knee orthosis rigid positional support with adjustable joints, post-op type with additional condylar control, drop lock hinges and suspension sleeves to prevent migration. The joints are set at 0 degrees of extension and 0 degrees of flexion. The flexion setting can be pushed in and adjusted to 90 degrees as needed. I felt it was a safer option to set the joints this way as it is less likely to accidentally unlock. The knee orthosis will stabilize and control the left lower extremity, allow for safe ambulation and protect the surgery site. Care use and function were explained to the patient. Contact info was given to patient. All went without incident.   Norman Mott [facility]  [phone]

## 2023-08-29 NOTE — PROGRESS NOTE ADULT - SUBJECTIVE AND OBJECTIVE BOX
SUBJECTIVE:   Patient seen and examined at bedside. Pt doing generally well.    Pain well controlled with medication       OBJECTIVE:  Vital Signs Last 24 Hrs  T(C): 36 (29 Aug 2023 04:00), Max: 36.6 (28 Aug 2023 18:43)  T(F): 96.8 (29 Aug 2023 04:00), Max: 97.9 (28 Aug 2023 18:43)  HR: 79 (29 Aug 2023 06:25) (63 - 91)  BP: 130/60 (29 Aug 2023 05:48) (123/76 - 187/89)  BP(mean): 86 (29 Aug 2023 05:48) (86 - 131)  RR: 16 (29 Aug 2023 05:48) (16 - 18)  SpO2: 100% (29 Aug 2023 06:25) (95% - 100%)    Parameters below as of 29 Aug 2023 05:48  Patient On (Oxygen Delivery Method): room air        General: NAD  Resp: Non-labored breathing, no accessory muscle use  Left Lower extremity:          Dressing: clean/dry/intact  (ace, knee immobilizer)         Pervena holding suction         Sensation: SILT         Motor exam+ TA/GS/EHL/FHL        +DP pulse      LABS:                        10.8   6.56  )-----------( 391      ( 29 Aug 2023 03:07 )             33.8     08-29    137  |  103  |  26<H>  ----------------------------<  178<H>  4.4   |  21<L>  |  1.21    Ca    9.0      29 Aug 2023 03:07    TPro  6.5  /  Alb  3.9  /  TBili  0.2  /  DBili  x   /  AST  16  /  ALT  16  /  AlkPhos  80  08-29    I&O's Summary    28 Aug 2023 07:01  -  29 Aug 2023 06:29  --------------------------------------------------------  IN: 1920 mL / OUT: 850 mL / NET: 1070 mL        MEDS:  MEDICATIONS  (STANDING):  acetaminophen   IVPB .. 1000 milliGRAM(s) IV Intermittent once  acetaminophen   IVPB .. 1000 milliGRAM(s) IV Intermittent once  amitriptyline 20 milliGRAM(s) Oral at bedtime  apixaban 2.5 milliGRAM(s) Oral every 12 hours  atorvastatin 20 milliGRAM(s) Oral at bedtime  gabapentin 600 milliGRAM(s) Oral two times a day  montelukast 10 milliGRAM(s) Oral daily  pantoprazole    Tablet 40 milliGRAM(s) Oral before breakfast  piperacillin/tazobactam IVPB.. 3.375 Gram(s) IV Intermittent every 8 hours  polyethylene glycol 3350 17 Gram(s) Oral at bedtime  pregabalin 75 milliGRAM(s) Oral two times a day  senna 2 Tablet(s) Oral at bedtime  vancomycin  IVPB 1250 milliGRAM(s) IV Intermittent every 24 hours    MEDICATIONS  (PRN):  aluminum hydroxide/magnesium hydroxide/simethicone Suspension 30 milliLiter(s) Oral four times a day PRN Indigestion  fentaNYL    Injectable 25 MICROGram(s) IV Push every 10 minutes PRN Moderate Pain (4 - 6)  magnesium hydroxide Suspension 30 milliLiter(s) Oral daily PRN Constipation  mometasone 220 MICROgram(s) Inhaler 2 Puff(s) Inhalation two times a day PRN cough  ondansetron Injectable 4 milliGRAM(s) IV Push once PRN Nausea and/or Vomiting  ondansetron Injectable 4 milliGRAM(s) IV Push every 6 hours PRN Nausea and/or Vomiting  traMADol 25 milliGRAM(s) Oral every 4 hours PRN Moderate Pain (4 - 6)  traMADol 50 milliGRAM(s) Oral every 4 hours PRN Severe Pain (7 - 10)

## 2023-08-29 NOTE — CONSULT NOTE ADULT - ASSESSMENT
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  No fevers, no leukocytosis  Concern for wound dehiscence, lower suspicion for infection given local findings  Arthrocentesis reassuring with low cell count--cultures pending, but NGTD at this point  Patient very well appearing  8/28 OR irrigation and debridement  Given antibiotic course given wound dehiscence, although minimal signs infection  Overall, Wound dehiscence, post operative state  - On discharge, can send out on Doxycycline 100mg q 12 and Ceftin 500mg q 12 to complete 14 days from most recent OR  - Continue Vanco/Zosyn while inpatient, monitor vanco levels  - Follow up synovial cultures for any further pathogen guidance (will adjust post discharge if any changes noted)  - Monitor for alternate signs infection  - Wound care per team    Aron Thompson MD  Contact on TEAMS messaging from 9am - 5pm  From 5pm-9am, on weekends, or if no response call [phone]

## 2023-08-29 NOTE — OCCUPATIONAL THERAPY INITIAL EVALUATION ADULT - DIAGNOSIS, OT EVAL
Pt presents s/p superficial irrigation and debridement of L knee for wound dehiscence with decreased functional mobility and  independence with ADLs

## 2023-08-29 NOTE — DISCHARGE NOTE PROVIDER - NSDCMRMEDTOKEN_GEN_ALL_CORE_FT
acetaminophen 325 mg oral tablet: 3 tab(s) orally every 8 hours X 5 days, then as needed for mild pain MDD: 3  amitriptyline 10 mg oral tablet: 2 orally once a day (at bedtime) cough  apixaban 2.5 mg oral tablet: 1 tab(s) orally 2 times a day X 4 weeks for the prevention of blood clots MDD: 2  atorvastatin 20 mg oral tablet: 1 milligram(s) orally once a day (at bedtime)  [place] brace: s/p superficial I&amp;D of L knee   ANASTASIIA: 99 mon  docusate sodium 100 mg oral tablet: 1 tab(s) orally 3 times a day to prevent constipation while taking narcotic pain medication (Stop if having diarrhea) MDD: 3  ferrous sulfate 324 mg (65 mg elemental iron) oral tablet: orally once a day  Gralise: 1200 milligram(s) orally once a day  montelukast 10 mg oral tablet: 1 tab(s) orally once a day  naproxen 500 mg oral tablet: 1 tab(s) orally 2 times a day X 2 weeks for pain, then stop MDD: 2  Narcan 4 mg/0.1 mL nasal spray: 4 milligram(s) intranasally every 2 to 3 minutes alternating between nostrils in event of narcotic overdose  pantoprazole 40 mg oral delayed release tablet: 1 tab(s) orally once a day before breakfast for gastrointestinal protection MDD: 1  polyethylene glycol 3350 oral powder for reconstitution: 17 gram(s) orally once a day (at bedtime)  pregabalin 75 mg oral capsule: 1 cap(s) orally 2 times a day  Qvar Redihaler 80 mcg/inh inhalation aerosol: 2 puff(s) inhaled 2 times a day as needed for  cough  senna leaf extract oral tablet: 2 tab(s) orally once a day (at bedtime) to prevent constipation while using narcotic (Stop if having diarrhea) MDD: 2  traMADol 50 mg oral tablet: 1 tab(s) orally every 6 hours as needed for Moderate Pain (4 - 6) ; 2 Tabs PO Q6H PRN Severe pain MDD: 6  trospium 60 mg oral capsule, extended release: 1 orally once a day   acetaminophen 500 mg oral tablet: 2 tab(s) orally every 8 hours Continue for 7 days then as needed  amitriptyline 10 mg oral tablet: 2 orally once a day (at bedtime) cough  apixaban 2.5 mg oral tablet: 1 tab(s) orally every 12 hours MDD: 1  cefuroxime 500 mg oral tablet: 1 tab(s) orally every 12 hours MDD: 2  doxycycline monohydrate 100 mg oral tablet: 1 tab(s) orally every 12 hours continue for 14 days MDD: 2  ferrous sulfate 324 mg (65 mg elemental iron) oral tablet: orally once a day  Gralise: 1200 milligram(s) orally once a day  montelukast 10 mg oral tablet: 1 tab(s) orally once a day  pantoprazole 40 mg oral delayed release tablet: 1 tab(s) orally once a day before breakfast for gastrointestinal protection MDD: 1  polyethylene glycol 3350 oral powder for reconstitution: 17 gram(s) orally once a day (at bedtime)  pregabalin 75 mg oral capsule: 1 cap(s) orally 2 times a day  Qvar Redihaler 80 mcg/inh inhalation aerosol: 2 puff(s) inhaled 2 times a day as needed for  cough  senna leaf extract oral tablet: 2 tab(s) orally once a day (at bedtime) to prevent constipation while using narcotic (Stop if having diarrhea) MDD: 2  traMADol 50 mg oral tablet: 1 tab(s) orally every 6 hours as needed for Severe Pain (7 - 10) MDD: 4  trospium 60 mg oral capsule, extended release: 1 orally once a day

## 2023-09-02 LAB
CULTURE RESULTS: SIGNIFICANT CHANGE UP
SPECIMEN SOURCE: SIGNIFICANT CHANGE UP

## 2023-09-05 ENCOUNTER — APPOINTMENT (OUTPATIENT)
Dept: ORTHOPEDIC SURGERY | Facility: CLINIC | Age: 73
End: 2023-09-05
Payer: MEDICARE

## 2023-09-05 PROCEDURE — 99024 POSTOP FOLLOW-UP VISIT: CPT

## 2023-09-05 NOTE — PHYSICAL EXAM
[de-identified] : Well developed, well nourished in no apparent distress, awake, alert and orientated to person, place and time with appropriate mood and affect Respirations are even and unlabored. Gait evaluation does not reveal a limp. There is no inguinal adenopathy. The affected limb is well-perfused with palpable pedal pulse, without skin lesions, shows a grossly normal motor and sensory examination. Incision is CDI. Knee motion is 0-90

## 2023-09-05 NOTE — HISTORY OF PRESENT ILLNESS
[de-identified] : Status-post left total knee  arthroplasty and superficial I&D here for initial postoperative evaluation. Excellent progress is noted in terms of pain and restoration of function. Pain is well controlled with oral medications. There has been no change in medical health since discharge. The patient does require assistive devices. She is using a knee immobilizer.

## 2023-09-05 NOTE — DISCUSSION/SUMMARY
[de-identified] : The patient is doing well after joint replacement surgery. Written infectious precautions were reviewed. The patient will progress with physical therapy at this time and they will work on transitioning from requiring assistive devices for ambulation. Anti-coagulant therapy will be discontinued at 1 month post surgery for the purpose of orthopedic thromboembolism prophylaxis. Return around the 6 week anniversary from surgery for follow-up evaluation.   The incisional VAC was removed today.  There was some mild bleeding which stopped with pressure dressing.  Aquacel was replaced.  The leg the knee immobilizer was unlocked to 90 degrees.  She will start physical therapy, 0 to 90 degrees and she will leave the brace on during PT.  She will continue ABX

## 2023-09-07 ENCOUNTER — NON-APPOINTMENT (OUTPATIENT)
Age: 73
End: 2023-09-07

## 2023-09-12 LAB
CULTURE RESULTS: NO GROWTH — SIGNIFICANT CHANGE UP
SPECIMEN SOURCE: SIGNIFICANT CHANGE UP

## 2023-09-19 ENCOUNTER — APPOINTMENT (OUTPATIENT)
Dept: FAMILY MEDICINE | Facility: CLINIC | Age: 73
End: 2023-09-19
Payer: MEDICARE

## 2023-09-19 ENCOUNTER — APPOINTMENT (OUTPATIENT)
Dept: ORTHOPEDIC SURGERY | Facility: CLINIC | Age: 73
End: 2023-09-19
Payer: MEDICARE

## 2023-09-19 ENCOUNTER — NON-APPOINTMENT (OUTPATIENT)
Age: 73
End: 2023-09-19

## 2023-09-19 VITALS
OXYGEN SATURATION: 95 % | WEIGHT: 165 LBS | TEMPERATURE: 98.6 F | DIASTOLIC BLOOD PRESSURE: 80 MMHG | BODY MASS INDEX: 30.36 KG/M2 | HEIGHT: 62 IN | SYSTOLIC BLOOD PRESSURE: 125 MMHG | HEART RATE: 83 BPM | RESPIRATION RATE: 16 BRPM

## 2023-09-19 VITALS — WEIGHT: 165 LBS | BODY MASS INDEX: 30.36 KG/M2 | HEIGHT: 62 IN

## 2023-09-19 PROCEDURE — 99024 POSTOP FOLLOW-UP VISIT: CPT

## 2023-09-19 PROCEDURE — 99215 OFFICE O/P EST HI 40 MIN: CPT

## 2023-09-19 RX ORDER — COLLAGENASE SANTYL 250 [ARB'U]/G
250 OINTMENT TOPICAL DAILY
Qty: 1 | Refills: 0 | Status: ACTIVE | COMMUNITY
Start: 2023-09-19 | End: 1900-01-01

## 2023-09-20 LAB
CULTURE RESULTS: SIGNIFICANT CHANGE UP
SPECIMEN SOURCE: SIGNIFICANT CHANGE UP

## 2023-09-21 ENCOUNTER — NON-APPOINTMENT (OUTPATIENT)
Age: 73
End: 2023-09-21

## 2023-09-28 ENCOUNTER — APPOINTMENT (OUTPATIENT)
Dept: ORTHOPEDIC SURGERY | Facility: CLINIC | Age: 73
End: 2023-09-28
Payer: MEDICARE

## 2023-09-28 VITALS — WEIGHT: 163 LBS | BODY MASS INDEX: 30 KG/M2 | HEIGHT: 62 IN

## 2023-09-28 PROCEDURE — 99024 POSTOP FOLLOW-UP VISIT: CPT

## 2023-10-16 ENCOUNTER — LABORATORY RESULT (OUTPATIENT)
Age: 73
End: 2023-10-16

## 2023-10-19 ENCOUNTER — APPOINTMENT (OUTPATIENT)
Dept: ORTHOPEDIC SURGERY | Facility: CLINIC | Age: 73
End: 2023-10-19
Payer: MEDICARE

## 2023-10-19 VITALS — HEIGHT: 62 IN | BODY MASS INDEX: 30.36 KG/M2 | WEIGHT: 165 LBS

## 2023-10-19 PROCEDURE — 99024 POSTOP FOLLOW-UP VISIT: CPT

## 2023-10-23 ENCOUNTER — TRANSCRIPTION ENCOUNTER (OUTPATIENT)
Age: 73
End: 2023-10-23

## 2023-10-23 ENCOUNTER — NON-APPOINTMENT (OUTPATIENT)
Age: 73
End: 2023-10-23

## 2023-10-26 ENCOUNTER — APPOINTMENT (OUTPATIENT)
Dept: FAMILY MEDICINE | Facility: CLINIC | Age: 73
End: 2023-10-26

## 2023-12-08 ENCOUNTER — APPOINTMENT (OUTPATIENT)
Dept: ORTHOPEDIC SURGERY | Facility: CLINIC | Age: 73
End: 2023-12-08

## 2023-12-10 ENCOUNTER — NON-APPOINTMENT (OUTPATIENT)
Age: 73
End: 2023-12-10

## 2023-12-24 ENCOUNTER — NON-APPOINTMENT (OUTPATIENT)
Age: 73
End: 2023-12-24

## 2023-12-26 ENCOUNTER — APPOINTMENT (OUTPATIENT)
Dept: ORTHOPEDIC SURGERY | Facility: CLINIC | Age: 73
End: 2023-12-26
Payer: MEDICARE

## 2023-12-26 VITALS — WEIGHT: 168 LBS | BODY MASS INDEX: 30.91 KG/M2 | HEIGHT: 62 IN

## 2023-12-26 DIAGNOSIS — Z96.652 PRESENCE OF LEFT ARTIFICIAL KNEE JOINT: ICD-10-CM

## 2023-12-26 PROCEDURE — 99213 OFFICE O/P EST LOW 20 MIN: CPT

## 2023-12-26 PROCEDURE — 73564 X-RAY EXAM KNEE 4 OR MORE: CPT | Mod: LT

## 2023-12-26 NOTE — HISTORY OF PRESENT ILLNESS
[de-identified] : This is very nice 73-year-old female here for interim evaluation of left total knee arthroplasty with hardware removal complicated by wound healing now wound healed. The patient reports good pain relief since surgery in the replaced joint and satisfactory restoration of function in terms of activities of daily living. The patient no longer requires an assistive device for ambulation, does not require pain medication, and completed postoperative physical therapy. They report unlimited activities of daily living and unlimited walking tolerance. The patient is thrilled with their progress from surgery and ultimate outcome.

## 2023-12-26 NOTE — PHYSICAL EXAM
[de-identified] : Patient is well nourished, well-developed, in no acute distress, with appropriate mood and affect. The patient is oriented to time, place, and person. Respirations are even and unlabored. Gait evaluation does not reveal a limp. There is no inguinal adenopathy. Examination of the contralateral knee shows normal range of motion, strength, no tenderness, and intact skin. The operative limb is well-perfused, has a well appearing surgical scar, and shows a grossly normal motor and sensory examination. Knee motion is painless and the left knee moves from 0-115 degrees. The knee is stable within that range-of-motion to AP and ML stress. The alignment of the knee is neutral. Muscle strength is normal. Quadriceps and hamstring muscle strength is normal bilaterally. Pedal pulses are palpable. [de-identified] : AP, lateral, tunnel, and sunrise knee x-rays of the left knee were ordered and obtained in the office and demonstrate satisfactory position and alignment of the components are present. No signs of loosening are seen.

## 2023-12-26 NOTE — DISCUSSION/SUMMARY
[de-identified] : The patient is doing well after left total knee arthroplasty. Continue to be weight bearing as tolerated without restriction. Daily home exercise program recommended. Follow up is recommended in 1 year.

## 2023-12-29 ENCOUNTER — APPOINTMENT (OUTPATIENT)
Dept: FAMILY MEDICINE | Facility: CLINIC | Age: 73
End: 2023-12-29
Payer: MEDICARE

## 2023-12-29 VITALS
TEMPERATURE: 97.3 F | WEIGHT: 168 LBS | RESPIRATION RATE: 16 BRPM | BODY MASS INDEX: 30.91 KG/M2 | DIASTOLIC BLOOD PRESSURE: 80 MMHG | HEART RATE: 97 BPM | SYSTOLIC BLOOD PRESSURE: 124 MMHG | HEIGHT: 62 IN | OXYGEN SATURATION: 96 %

## 2023-12-29 DIAGNOSIS — R05.9 COUGH, UNSPECIFIED: ICD-10-CM

## 2023-12-29 DIAGNOSIS — J32.9 CHRONIC SINUSITIS, UNSPECIFIED: ICD-10-CM

## 2023-12-29 PROCEDURE — 99214 OFFICE O/P EST MOD 30 MIN: CPT

## 2024-01-01 ENCOUNTER — NON-APPOINTMENT (OUTPATIENT)
Age: 74
End: 2024-01-01

## 2024-02-09 DIAGNOSIS — Z00.00 ENCOUNTER FOR GENERAL ADULT MEDICAL EXAMINATION W/OUT ABNORMAL FINDINGS: ICD-10-CM

## 2024-02-22 ENCOUNTER — APPOINTMENT (OUTPATIENT)
Dept: FAMILY MEDICINE | Facility: CLINIC | Age: 74
End: 2024-02-22
Payer: MEDICARE

## 2024-02-22 VITALS
TEMPERATURE: 96.9 F | HEIGHT: 62 IN | RESPIRATION RATE: 16 BRPM | SYSTOLIC BLOOD PRESSURE: 121 MMHG | OXYGEN SATURATION: 97 % | DIASTOLIC BLOOD PRESSURE: 75 MMHG | HEART RATE: 88 BPM | BODY MASS INDEX: 31.28 KG/M2 | WEIGHT: 170 LBS

## 2024-02-22 DIAGNOSIS — Z98.890 OTHER SPECIFIED POSTPROCEDURAL STATES: ICD-10-CM

## 2024-02-22 DIAGNOSIS — Z01.84 ENCOUNTER FOR ANTIBODY RESPONSE EXAMINATION: ICD-10-CM

## 2024-02-22 DIAGNOSIS — H91.93 UNSPECIFIED HEARING LOSS, BILATERAL: ICD-10-CM

## 2024-02-22 DIAGNOSIS — Z00.00 ENCOUNTER FOR GENERAL ADULT MEDICAL EXAMINATION W/OUT ABNORMAL FINDINGS: ICD-10-CM

## 2024-02-22 DIAGNOSIS — E78.5 HYPERLIPIDEMIA, UNSPECIFIED: ICD-10-CM

## 2024-02-22 DIAGNOSIS — E66.9 OBESITY, UNSPECIFIED: ICD-10-CM

## 2024-02-22 PROCEDURE — G0447 BEHAVIOR COUNSEL OBESITY 15M: CPT

## 2024-02-22 PROCEDURE — G0439: CPT

## 2024-02-22 NOTE — PHYSICAL EXAM
[No Acute Distress] : no acute distress [Well Developed] : well developed [Well Nourished] : well nourished [Well-Appearing] : well-appearing [Normal Sclera/Conjunctiva] : normal sclera/conjunctiva [EOMI] : extraocular movements intact [PERRL] : pupils equal round and reactive to light [Normal Oropharynx] : the oropharynx was normal [Normal Outer Ear/Nose] : the outer ears and nose were normal in appearance [No Lymphadenopathy] : no lymphadenopathy [No JVD] : no jugular venous distention [Thyroid Normal, No Nodules] : the thyroid was normal and there were no nodules present [Supple] : supple [No Respiratory Distress] : no respiratory distress  [Clear to Auscultation] : lungs were clear to auscultation bilaterally [No Accessory Muscle Use] : no accessory muscle use [Normal Rate] : normal rate  [Regular Rhythm] : with a regular rhythm [Normal S1, S2] : normal S1 and S2 [No Abdominal Bruit] : a ~M bruit was not heard ~T in the abdomen [No Carotid Bruits] : no carotid bruits [No Varicosities] : no varicosities [Pedal Pulses Present] : the pedal pulses are present [No Edema] : there was no peripheral edema [No Palpable Aorta] : no palpable aorta [No Extremity Clubbing/Cyanosis] : no extremity clubbing/cyanosis [Soft] : abdomen soft [Non Tender] : non-tender [Non-distended] : non-distended [No HSM] : no HSM [No Masses] : no abdominal mass palpated [Normal Bowel Sounds] : normal bowel sounds [Normal Posterior Cervical Nodes] : no posterior cervical lymphadenopathy [Normal Anterior Cervical Nodes] : no anterior cervical lymphadenopathy [No CVA Tenderness] : no CVA  tenderness [No Spinal Tenderness] : no spinal tenderness [No Joint Swelling] : no joint swelling [Grossly Normal Strength/Tone] : grossly normal strength/tone [No Rash] : no rash [Coordination Grossly Intact] : coordination grossly intact [No Focal Deficits] : no focal deficits [Normal Gait] : normal gait [Deep Tendon Reflexes (DTR)] : deep tendon reflexes were 2+ and symmetric [Normal Affect] : the affect was normal [Normal Insight/Judgement] : insight and judgment were intact [de-identified] : Gr 1/6 systolic M

## 2024-02-22 NOTE — HEALTH RISK ASSESSMENT
[Very Good] : ~his/her~  mood as very good [Never (0 pts)] : Never (0 points) [No] : In the past 12 months have you used drugs other than those required for medical reasons? No [No falls in past year] : Patient reported no falls in the past year [0] : 2) Feeling down, depressed, or hopeless: Not at all (0) [de-identified] : daily [[id number]] : 0

## 2024-02-22 NOTE — HISTORY OF PRESENT ILLNESS
[de-identified] : 73y/o F with PMHx of HLD (Atorvastatin) and GERD presents to the office for routine Medicare Annual Wellness Exam s/p Knee rep[lacement revision doing well.  UTD with immunizations

## 2024-03-11 ENCOUNTER — NON-APPOINTMENT (OUTPATIENT)
Age: 74
End: 2024-03-11

## 2024-04-18 ENCOUNTER — NON-APPOINTMENT (OUTPATIENT)
Age: 74
End: 2024-04-18

## 2024-04-24 ENCOUNTER — APPOINTMENT (OUTPATIENT)
Dept: FAMILY MEDICINE | Facility: CLINIC | Age: 74
End: 2024-04-24
Payer: MEDICARE

## 2024-04-24 VITALS
OXYGEN SATURATION: 95 % | HEIGHT: 62 IN | RESPIRATION RATE: 18 BRPM | TEMPERATURE: 97.7 F | SYSTOLIC BLOOD PRESSURE: 112 MMHG | DIASTOLIC BLOOD PRESSURE: 72 MMHG | HEART RATE: 84 BPM

## 2024-04-24 DIAGNOSIS — K52.9 NONINFECTIVE GASTROENTERITIS AND COLITIS, UNSPECIFIED: ICD-10-CM

## 2024-04-24 DIAGNOSIS — R73.9 HYPERGLYCEMIA, UNSPECIFIED: ICD-10-CM

## 2024-04-24 DIAGNOSIS — R01.1 CARDIAC MURMUR, UNSPECIFIED: ICD-10-CM

## 2024-04-24 PROCEDURE — G2211 COMPLEX E/M VISIT ADD ON: CPT

## 2024-04-24 PROCEDURE — 99213 OFFICE O/P EST LOW 20 MIN: CPT

## 2024-04-24 NOTE — PHYSICAL EXAM
[Normal] : no acute distress, well nourished, well developed and well-appearing [Soft] : abdomen soft [Non-distended] : non-distended [No Masses] : no abdominal mass palpated [Normal Bowel Sounds] : normal bowel sounds [No Hernias] : no hernias [de-identified] : mildly TTP mid eoigastrium

## 2024-04-24 NOTE — HISTORY OF PRESENT ILLNESS
[FreeTextEntry8] : 75 y/o F PMHx HTN presents for  stomach issues. Went to UC 5 days ago dx with Norovirus.  Still not feeling wll. Decreased appetite. Still has " dull ache abdominal pain".  Had 1 episode of  vomiting now resolved. Abdomen pain worse when eating. Lost 9 pound

## 2024-04-24 NOTE — REVIEW OF SYSTEMS
[Recent Change In Weight] : ~T recent weight change [Abdominal Pain] : abdominal pain [Vomiting] : no vomiting [Negative] : Genitourinary

## 2024-04-29 ENCOUNTER — NON-APPOINTMENT (OUTPATIENT)
Age: 74
End: 2024-04-29

## 2024-06-03 ENCOUNTER — RX RENEWAL (OUTPATIENT)
Age: 74
End: 2024-06-03

## 2024-06-04 ENCOUNTER — APPOINTMENT (OUTPATIENT)
Dept: GASTROENTEROLOGY | Facility: CLINIC | Age: 74
End: 2024-06-04
Payer: MEDICARE

## 2024-06-04 VITALS
HEIGHT: 62 IN | SYSTOLIC BLOOD PRESSURE: 130 MMHG | WEIGHT: 167 LBS | DIASTOLIC BLOOD PRESSURE: 77 MMHG | BODY MASS INDEX: 30.73 KG/M2 | HEART RATE: 80 BPM

## 2024-06-04 DIAGNOSIS — Z86.010 PERSONAL HISTORY OF COLONIC POLYPS: ICD-10-CM

## 2024-06-04 DIAGNOSIS — Z80.0 FAMILY HISTORY OF MALIGNANT NEOPLASM OF DIGESTIVE ORGANS: ICD-10-CM

## 2024-06-04 DIAGNOSIS — K21.9 GASTRO-ESOPHAGEAL REFLUX DISEASE W/OUT ESOPHAGITIS: ICD-10-CM

## 2024-06-04 PROCEDURE — 99204 OFFICE O/P NEW MOD 45 MIN: CPT | Mod: 25

## 2024-06-04 PROCEDURE — 82274 ASSAY TEST FOR BLOOD FECAL: CPT | Mod: QW

## 2024-06-04 RX ORDER — PROMETHAZINE HYDROCHLORIDE AND DEXTROMETHORPHAN HYDROBROMIDE ORAL SOLUTION 15; 6.25 MG/5ML; MG/5ML
6.25-15 SOLUTION ORAL
Qty: 180 | Refills: 0 | Status: DISCONTINUED | COMMUNITY
Start: 2024-03-19 | End: 2024-06-04

## 2024-06-04 RX ORDER — DICLOFENAC SODIUM 50 MG/1
50 TABLET, DELAYED RELEASE ORAL
Qty: 90 | Refills: 0 | Status: DISCONTINUED | COMMUNITY
Start: 2020-03-24 | End: 2024-06-04

## 2024-06-04 RX ORDER — TRAMADOL HYDROCHLORIDE 50 MG/1
50 TABLET, COATED ORAL
Qty: 21 | Refills: 0 | Status: DISCONTINUED | COMMUNITY
Start: 2023-08-21 | End: 2024-06-04

## 2024-06-04 RX ORDER — SODIUM SULFATE, POTASSIUM SULFATE AND MAGNESIUM SULFATE 1.6; 3.13; 17.5 G/177ML; G/177ML; G/177ML
17.5-3.13-1.6 SOLUTION ORAL
Qty: 1 | Refills: 0 | Status: ACTIVE | COMMUNITY
Start: 2024-06-04 | End: 1900-01-01

## 2024-06-04 NOTE — HISTORY OF PRESENT ILLNESS
[FreeTextEntry1] : Rosalie presents for consideration of repeat colonoscopy.  She has history of colonic polyps (hyperplastic in 2014), with tortuous spastic left colon and hemorrhoids noted at last colonoscopy June 2019.  She voices concerns, as two cousins had colon cancer.  She has history of reflux, taking pantoprazole daily.  Perhaps every 5-6 weeks, she has had breakthrough symptoms for which she would either take Tums or her 's omeprazole, with relief.  She underwent left knee surgeries since last visit.

## 2024-06-04 NOTE — ASSESSMENT
[FreeTextEntry1] : 1. History of colonic polyps, family history of colon cancer (two cousins); tortuous spastic left colon and hemorrhoids at last colonoscopy June 2019--rule out colorectal neoplasia. 2. GERD, without alarm symptoms, on PPI. 3. Obesity. 4. Hard of hearing. 5. Heart murmur. 6. Hyperlipidemia. 7. History of psoriasis. 8. Osteoarthritis, status post left knee surgeries; DJD of spine, osteopenia. 9. Ex-smoker. 10. Status post bladder suspension surgery, appendectomy, right inguinal herniorrhaphy, thymectomy, shoulder arthroscopy, left wrist ganglion cyst excision. 11. Allergic to narcotic analgesics.  Seasonal allergies.  Plan: 1. Medical records, including latest labs, reviewed. 2. Agree with repeat colonoscopy--Procedure, rationale, alternatives (including Cologuard), material risks, anesthesia plan, and Suprep instructions were reviewed and brochure given. 3.  No plans for EGD in the absence of alarm symptoms.

## 2024-06-04 NOTE — PHYSICAL EXAM
[Alert] : alert [No Acute Distress] : no acute distress [Well Developed] : well developed [Well Nourished] : well nourished [Obese (BMI >= 30)] : obese (BMI >= 30) [Heart Rate And Rhythm] : heart rate was normal and rhythm regular [None] : no edema [Bowel Sounds] : normal bowel sounds [Abdomen Tenderness] : non-tender [No Masses] : no abdominal mass palpated [Abdomen Soft] : soft [] : no hepatosplenomegaly [No Hernia] : no hernia [Normal Sphincter Tone] : normal sphincter tone [No External Hemorrhoid] : no external hemorrhoids [Occult Blood] : negative occult blood [FIT Test] : negative FIT test [Inguinal Lymph Nodes Enlarged Bilaterally] : no inguinal lymphadenopathy [Normal Color / Pigmentation] : normal skin color and pigmentation [Normal] : oriented to person, place, and time [de-identified] : hard-of-hearing [de-identified] : 2-3/6 systolic murmur [de-identified] : LE varicose veins [de-identified] : surgical scars [de-identified] : left knee surgical scars

## 2024-06-04 NOTE — REASON FOR VISIT
[Follow-up] : a follow-up of an existing diagnosis [Spouse] : spouse [FreeTextEntry1] : Pre-colonoscopy exam, heartburn

## 2024-08-05 ENCOUNTER — APPOINTMENT (OUTPATIENT)
Dept: GASTROENTEROLOGY | Facility: CLINIC | Age: 74
End: 2024-08-05

## 2024-08-05 ENCOUNTER — LABORATORY RESULT (OUTPATIENT)
Age: 74
End: 2024-08-05

## 2024-08-05 PROCEDURE — 45380 COLONOSCOPY AND BIOPSY: CPT

## 2024-08-06 ENCOUNTER — RX RENEWAL (OUTPATIENT)
Age: 74
End: 2024-08-06

## 2024-08-12 ENCOUNTER — NON-APPOINTMENT (OUTPATIENT)
Age: 74
End: 2024-08-12

## 2024-10-16 NOTE — H&P ADULT - ATTENDING COMMENTS
What Type Of Note Output Would You Prefer (Optional)?: Standard Output How Severe Is Your Skin Lesion?: moderate Has Your Skin Lesion Been Treated?: not been treated Is This A New Presentation, Or A Follow-Up?: Skin Lesions I agree with the above note and have personally seen and examined this patient. All pertinent films have been reviewed. Please refer to clinical documentation of the history, physical examinations, data summary, and both assessment and plan as documented above and with which I agree.    Notified about concern for wound today by PT.  Patient asked to come ot [facility] ED immediately  I personally evaluated her there.  She has no pain in the knee, thrilled with progress, no fevers/chills  She reports no active drainage  NAD  LLE  wound with central eschar on incision, debrided at bedside and delayed wound healing found, no evidence of fluctuance or purulence, no exudate  nonviable tissue sharply debrided with knife at bedside  joint aspirated via superolateral portal, 10cc clear blood tinged fluid resulted  Cell count #50/74%, culture pending  no e/o deep PJI  no e/o cellulitis  will treat for delayed wound healing and wound dehiscence without infection, likely 2/2 multiple prior knee operations  options to treat with vac vs primary closure in OR  risks/benefits discussed  patient opts for primary knee closure in OR and ivac, possible risk of need for deep I&D and L TKA revision should the joint capsule be found to be violated in the OR  npo  hold dvtp  to OR jose maria Sanchez M.D.  Attending Orthopedic Surgeon     Mikey Sanchez MD  Attending Orthopedic Surgeon 24

## 2024-11-19 ENCOUNTER — NON-APPOINTMENT (OUTPATIENT)
Age: 74
End: 2024-11-19

## 2024-11-21 ENCOUNTER — NON-APPOINTMENT (OUTPATIENT)
Age: 74
End: 2024-11-21

## 2024-11-29 ENCOUNTER — APPOINTMENT (OUTPATIENT)
Dept: ORTHOPEDIC SURGERY | Facility: CLINIC | Age: 74
End: 2024-11-29
Payer: MEDICARE

## 2024-11-29 VITALS — WEIGHT: 162 LBS | BODY MASS INDEX: 29.81 KG/M2 | HEIGHT: 62 IN

## 2024-11-29 DIAGNOSIS — M25.562 PAIN IN RIGHT KNEE: ICD-10-CM

## 2024-11-29 DIAGNOSIS — G89.29 PAIN IN RIGHT KNEE: ICD-10-CM

## 2024-11-29 DIAGNOSIS — Z96.652 PRESENCE OF LEFT ARTIFICIAL KNEE JOINT: ICD-10-CM

## 2024-11-29 DIAGNOSIS — M25.561 PAIN IN RIGHT KNEE: ICD-10-CM

## 2024-11-29 PROCEDURE — G2211 COMPLEX E/M VISIT ADD ON: CPT

## 2024-11-29 PROCEDURE — 99214 OFFICE O/P EST MOD 30 MIN: CPT

## 2024-11-29 PROCEDURE — 73564 X-RAY EXAM KNEE 4 OR MORE: CPT | Mod: 50

## 2024-12-05 NOTE — ED PROVIDER NOTE - NS_BEDUNITTYPES_ED_ALL_ED
MEDICARE WELLNESS VISIT + SOAP NOTE    Patient Care Team:  Heather Sanchez MD as PCP - General ([facility])    Ale presents for her Subsequent Annual Medicare Wellness Visit with Medicare Wellness Visit (Subsequent)   Acute and chronic problems were discussed separately below.    Status MWV Topics Details (Refresh Note to Update)    Depression Screening (Trend)   PHQ2 Interpretation Negative          PHQ2: Score = 0     Depression screening is negative no further plan needed.      Blood Pressure  Weight  BMI     /72  Current Weight 192 lbs  body mass index is 36.25 kg/m².        Advanced Directives on File Yes on file.        Health Risk Assessment  (Click to Review or Edit)   Completed      Falls Risk  Have you had a fall in the past year?................................. No  Do you feel unsteady when standing or walking?........ No  Do you worry about falling?..................................................Yes.  Gait/Balance: Normal      Tobacco/Alcohol/Drugs Former Smoker      reports current alcohol use.   reports no history of drug use.      Opioid Review (Update Meds)      No opioid on med list.  is not taking opioid medications.      Cognitive/Functional Status  (Optional MOCA  Mini Cog)   no evidence of cognitive dysfunction by direct observation    Vision and Hearing Screens     No results found.      Care Gaps/Screenings  (Click to Review and Order) See patient instructions and orders         The following items on the Medicare Health Risk Assessment were found to be positive  7c.) Do you worry about falling?: Yes     11d.) Bodily pain: Often         I reviewed and updated the past Medical, Surgical, Family, Social History, Allergies and Medications in Epic: Yes    Family History   Problem Relation Age of Onset    Postmenopausal breast cancer Mother 65    Hypertension Mother     Hyperlipidemia Mother     Cancer, Prostate Father     Stroke Father     Diabetes Sister     Heart disease  Sister     Asthma Sister     Other Brother     Patient is unaware of any medical problems Maternal Grandmother     Patient is unaware of any medical problems Maternal Grandfather     Dementia/Alzheimers Paternal Grandmother     Cancer, Colon Paternal Grandfather          SOAP NOTE       Kyung Aguilera is a 80 year old here for Medicare Wellness Visit (Subsequent)  The patient presents for a routine checkup.    She reports no current health concerns and maintains a positive mood. She has not experienced any falls and believes her balance is satisfactory. She is cautious due to her age and has been attending physical therapy for her back. She performs additional exercises for her hands and legs and aims to walk between 4000 and 5000 steps daily. She also engages in regular housework.    Her A1c level is 5.2, which she attributes to reduced stress. She has noticed that her glucose levels tend to rise after eating. She is currently taking chlorothiazide and is up-to-date with her COVID-19 and influenza vaccinations.    She underwent a mammogram last year and continues to see Dr. Cavanaugh for her back issues. She received an injection in August 2023, which provided temporary relief. She experiences intermittent pain, but it is manageable. She plans to revisit Dr. Cavanaugh after the new year to discuss potential surgery. She has previously taken gabapentin, but it did not provide significant relief.    FAMILY HISTORY  - Sister is diabetic  Review of Systems  As above    SDOH Former Smoker       Objective   Vitals:    12/05/24 1013   BP: 138/72   Pulse: 88   SpO2: 97%   Weight: 87.1 kg (192 lb)   Height: 5' 1.02\" (1.55 m)   BMI (Calculated): 36.25     Physical Exam  GEN: Alert and oriented ×3, no acute distress.  HEENT: Pupils are equal round reactive to light. Extraocular movements intact. Mucous membranes moist. No oral ulcers or pharyngeal exudates. Neck was supple without adenopathy or thyromegaly. Tympanic  membranes were clear.  CV: Heart was regular rate and rhythm without any murmurs. No carotid bruits.  RESP: Clear to auscultation bilaterally. No wheezing. No rhonchi.  EXT: Soft, nontender. No deformities. No edema.  NEURO: no focal neurologic deficit  SKIN: no rashes noted             ASSESSMENT AND PLAN        Routine checkup.  Her cholesterol levels are within the desired range, showing a decrease from the previous year. The LDL cholesterol is at 57, down from 78 last year. Blood glucose levels are normal, with an A1c of 5.2, indicating a decrease. Sodium levels are slightly low, but not concerning. Fasting glucose is elevated, but with a normal A1c, there is no cause for concern. Bilirubin is slightly elevated, likely due to fasting and mild dehydration. All other parameters are within normal limits. She is advised to maintain her current exercise regimen.     Medication Management.  Prescriptions for atorvastatin and chlorthalidone will be refilled to ensure she has enough medication until her next visit.  Hypertension and hyperlipidemia are both controlled    Health Maintenance.  A pneumonia vaccine will be administered today. A bone density test is recommended for next year. A mammogram will be scheduled as she had one done last year on December 12.        1. Medicare annual wellness visit, subsequent  2. Mixed hyperlipidemia  -     atorvastatin (LIPITOR) 20 MG tablet; Take 1 tablet by mouth daily.  3. Essential hypertension, benign  -     chlorthalidone (THALITONE) 25 MG tablet; Take 1 tablet by mouth daily.  4. Immunization due  -     PNEUMOCOCCAL 20 (DIAGUOC11)  5. Encounter for screening mammogram for breast cancer  -     MAMMO SCREENING BILATERAL W JENNIFER; Future      Return in about 6 months (around 6/5/2025) for Recheck of medications.             SURGERY

## 2025-01-28 NOTE — PATIENT PROFILE ADULT - OVER THE PAST TWO WEEKS, HAVE YOU FELT LITTLE INTEREST OR PLEASURE IN DOING THINGS?
Outpatient Rehab    Physical Therapy Evaluation    Patient Name: Luisa Padilla  MRN: [id number]  YOB: 1959  Today's Date: 2025    Therapy Diagnosis:   Encounter Diagnoses   Name Primary?    Anterolisthesis of lumbar spine     Decreased range of motion of lumbar spine Yes    Weakness of both hips      Physician: Emani Spann NP    Physician Orders: Eval and Treat  Medical Diagnosis: M43.16 (ICD-10-CM) - Anterolisthesis of lumbar spine     Visit # / Visits Authorized:     Date of Evaluation:  2025   Insurance Authorization Period: 2025 to 2026  Plan of Care Certification:  2025 to 2025     Time In: 0900   Time Out: 1000  Total Time: 60   Total Billable Time: 60         Subjective   History of Present Illness  Luisa is a 65 y.o. female who reports to physical therapy with a chief concern of low back pain. According to the patient's chart, Luisa has a past medical history of BRCA gene mutation negative, BRCA1 negative, BRCA2 negative, Cataract, Epiretinal membrane (ERM) of left eye, Menopause, HOLLAND (obstructive sleep apnea), and Seasonal allergies. Luisa has a past surgical history that includes  section; Colonoscopy (N/A, 04/10/2019); Vitrectomy by pars plana approach (Left, 2020); Eye surgery (Left); tenosynovectomy, forearm or wrist (Left, 2023); Cataract extraction w/  intraocular lens implant (Left, 2024); and Cataract extraction w/  intraocular lens implant (Right, 2024).    The patient reports a medical diagnosis of Anterolisthesis of lumbar spine.    Diagnostic tests related to this condition: X-ray.   X-Ray Details: FINDINGS:  Bones are demineralized.  No spondylolysis.  Vertebral body heights disc spaces and alignment is satisfactory.  Minimal anterolisthesis L3 on L4.  Vascular calcifications noted.    History of Present Condition/Illness: Patient reports that she has noticed a lot of weakness in her back for many years  which would also cause pain. A few years ago she was in an exercise class that only used large stability balls. After a while this caused her back to hurt so she stopped. She started doing Pilates and Mauro. She had to stop this about 3 weeks ago because she threw out her back lifting a large bag of dog food. She saw the doctor and was put on anti-inflammatories which has been helping. Her pain tends to be worse with bending, lifting, prolonged sitting or standing in 1 position, and with exercise classes. Her pain improves with rest, medication, and laying on her back wit her knees bent.     Pain     Patient reports a current pain level of 0/10. Pain at best is reported as 0/10. Pain at worst is reported as 6/10.   Location: right low back  Clinical Progression (since onset): Stable  Pain Qualities: Aching, Burning, Sharp  Pain-Relieving Factors: Rest, Medications - over-the-counter, Lying down  Pain-Aggravating Factors: Other (Comment)  Other Pain-Aggravating Factors: bending, lifting, prolonged sitting or standing in 1 position, and with exercise classes         Living Arrangements  Living Situation  Housing: Home independently  Living Arrangements: Spouse/significant other    Home Setup  Type of Structure: House  Number of Levels in Home: Two level        Employment  Patient does not report that: Does the patient's condition impact their ability to work?  Employment Status: Employed full-time          Past Medical History/Physical Systems Review:   Luisa Padilla  has a past medical history of BRCA gene mutation negative, BRCA1 negative, BRCA2 negative, Cataract, Epiretinal membrane (ERM) of left eye, Menopause, HOLLAND (obstructive sleep apnea), and Seasonal allergies.    Luisa Padilla  has a past surgical history that includes  section; Colonoscopy (N/A, 04/10/2019); Vitrectomy by pars plana approach (Left, 2020); Eye surgery (Left); tenosynovectomy, forearm or wrist (Left,  07/18/2023); Cataract extraction w/  intraocular lens implant (Left, 02/26/2024); and Cataract extraction w/  intraocular lens implant (Right, 03/18/2024).    Luisa has a current medication list which includes the following prescription(s): arexvy (pf), biotin, cholecalciferol (vitamin d3), vevye, escitalopram oxalate, fexofenadine, glucosamine-chondroitin, meloxicam, mometasone 0.1%, prevnar 20 (pf), psyllium, trazodone, and vit c/e/zinc ox/lester/lut/zeax, and the following Facility-Administered Medications: sodium chloride 0.9%.    Review of patient's allergies indicates:   Allergen Reactions    Latex, natural rubber Hives and Itching        Objective   Posture    Increased thoracic kyphosis and Increased lumbar lordosis is observed.     Pelvic tilt observed: Anterior              Spinal Mobility  Hypomobile: Lumbosacral  Lumbosacral Mobility Details: Hypomobile R SI joint and mid lumbar spine with sacral rocking.        Lumbar Range of Motion   Active (deg) Passive (deg) Pain   Flexion 100       Extension 25   Yes   Right Lateral Flexion 75       Right Rotation         Left Lateral Flexion 75       Left Rotation                  Hip Range of Motion   Right Hip   Active (deg) Passive (deg) Pain   Flexion   100     Extension   5 Yes   ABduction   45     ADduction         External Rotation 90/90   50     External Rotation Prone         Internal Rotation 90/90   15 Yes   Internal Rotation Prone             Left Hip   Active (deg) Passive (deg) Pain   Flexion   100     Extension   5     ABduction   45     ADduction         External Rotation 90/90   50     External Rotation Prone         Internal Rotation 90/90   15     Internal Rotation Prone                            Hip Strength - Planes of Motion   Right Strength Right Pain Left Strength Left  Pain   Flexion (L2)           Extension 3-   3     ABduction 3+   3+     ADduction           Internal Rotation 4   4     External Rotation 3+   3+             Lumbar/Pelvic  "Girdle Special Tests            Other Lumbar Tests  Positive: Right Quadrant  Negative: Left Quadrant                     Ambulation Assistance Required  Surface With  Assistive Device Without Assistive Device Details   Level Independent        Uneven         Curb           Ambulation Details    No noticeable gait deviations.     Gait Analysis  Base of Support: Normal                   Intake Outcome Measure for FOTO Survey    Therapist reviewed FOTO scores for Luisa Padilla on 1/29/2025.   FOTO report - see Media section or FOTO account episode details.     Intake Score: 51%    Treatment:  Therapeutic Exercise  Therapeutic Exercise Activity 1: Seated thoracic extension over a chair, 15x  Therapeutic Exercise Activity 2: Bridges, 15x  Therapeutic Exercise Activity 3: Posterior pelvic tilts, 15x 3" holds    Patient's spiritual, cultural, and educational needs considered and patient agreeable to plan of care and goals.     Assessment & Plan   Assessment  Luisa presents with a condition of Low complexity.   Presentation of Symptoms: Stable  Will Comorbidities Impact Care: No       Functional Limitations: Activity tolerance, Carrying objects, Functional mobility, Pain with ADLs/IADLs, Standing tolerance  Impairments: Abnormal muscle firing, Abnormal or restricted range of motion, Activity intolerance, Impaired physical strength    Patient Goal for Therapy (PT): to improve her back pain and learn how to manage her symptoms for the long term  Prognosis: Good  Assessment Details: Patient is a 65 year old female who presents to PT with a medical diagnosis of Anterolisthesis of lumbar spine. She has a multi year history of R sided low back pain that has exacerbated over the past few months. She demonstrates postural abnormalities, decreased and painful lumbar spine AROM, decreased hip ROM, bilateral hip weakness, and impaired overall functional ability.     Plan  From a physical therapy perspective, the patient would " benefit from: Skilled Rehab Services    Planned therapy interventions include: Therapeutic exercise, Therapeutic activities, Neuromuscular re-education, Manual therapy, ADLs/IADLs, and Gait training.    Planned modalities to include: Electrical stimulation - attended and Electrical stimulation - passive/unattended.        Visit Frequency: 2 times Per Week for 12 Weeks.       This plan was discussed with Patient.   Discussion participants: Agreed Upon Plan of Care             Goals:   Active       Long term goals       Pt will improve FOTO score to </= 65% limited to decrease perceived limitation with maintaining/changing body position.  (Progressing)       Start:  01/29/25    Expected End:  04/23/25            Pt will perform bird dogs with good control to demonstrate improved core strength. (Progressing)       Start:  01/29/25    Expected End:  04/23/25            Pt will improve impaired LE strength by 1/2 MMT grade to improve strength for functional tasks. (Progressing)       Start:  01/29/25    Expected End:  04/23/25            Pt will report no pain during lumbar ROM to promote functional mobility. (Progressing)       Start:  01/29/25    Expected End:  04/23/25               Short term goals       Pt will be compliant with HEP to supplement PT in decreasing pain with functional mobility. (Progressing)       Start:  01/29/25    Expected End:  03/12/25            Pt will perform RDLs with good control to demonstrate improved core strength. (Progressing)       Start:  01/29/25    Expected End:  03/12/25            Pt will improve lumbar ROM to </=minimal loss in all planes to improve functional mobility. (Progressing)       Start:  01/29/25    Expected End:  03/12/25            Pt will improve impaired LE strength by 1/2 MMT grade to improve strength for functional tasks. (Progressing)       Start:  01/29/25    Expected End:  03/12/25                     no

## 2025-02-07 DIAGNOSIS — Z00.00 ENCOUNTER FOR GENERAL ADULT MEDICAL EXAMINATION W/OUT ABNORMAL FINDINGS: ICD-10-CM

## 2025-02-14 ENCOUNTER — NON-APPOINTMENT (OUTPATIENT)
Age: 75
End: 2025-02-14

## 2025-02-25 ENCOUNTER — APPOINTMENT (OUTPATIENT)
Dept: FAMILY MEDICINE | Facility: CLINIC | Age: 75
End: 2025-02-25
Payer: MEDICARE

## 2025-02-25 VITALS
OXYGEN SATURATION: 97 % | WEIGHT: 170 LBS | DIASTOLIC BLOOD PRESSURE: 80 MMHG | BODY MASS INDEX: 31.28 KG/M2 | HEART RATE: 94 BPM | HEIGHT: 62 IN | RESPIRATION RATE: 16 BRPM | SYSTOLIC BLOOD PRESSURE: 142 MMHG | TEMPERATURE: 97.7 F

## 2025-02-25 DIAGNOSIS — E78.5 HYPERLIPIDEMIA, UNSPECIFIED: ICD-10-CM

## 2025-02-25 DIAGNOSIS — R01.1 CARDIAC MURMUR, UNSPECIFIED: ICD-10-CM

## 2025-02-25 DIAGNOSIS — H91.93 UNSPECIFIED HEARING LOSS, BILATERAL: ICD-10-CM

## 2025-02-25 DIAGNOSIS — E66.9 OBESITY, UNSPECIFIED: ICD-10-CM

## 2025-02-25 DIAGNOSIS — Z00.00 ENCOUNTER FOR GENERAL ADULT MEDICAL EXAMINATION W/OUT ABNORMAL FINDINGS: ICD-10-CM

## 2025-02-25 PROCEDURE — G0439: CPT

## 2025-03-26 DIAGNOSIS — Z13.820 ENCOUNTER FOR SCREENING FOR OSTEOPOROSIS: ICD-10-CM

## 2025-04-03 ENCOUNTER — NON-APPOINTMENT (OUTPATIENT)
Age: 75
End: 2025-04-03

## 2025-05-17 ENCOUNTER — NON-APPOINTMENT (OUTPATIENT)
Age: 75
End: 2025-05-17

## 2025-05-27 RX ORDER — AMOXICILLIN 500 MG/1
500 TABLET, FILM COATED ORAL 3 TIMES DAILY
Qty: 30 | Refills: 0 | Status: ACTIVE | COMMUNITY
Start: 2025-05-27 | End: 1900-01-01

## 2025-06-25 ENCOUNTER — RX RENEWAL (OUTPATIENT)
Age: 75
End: 2025-06-25

## 2025-07-15 ENCOUNTER — RX RENEWAL (OUTPATIENT)
Age: 75
End: 2025-07-15

## 2025-08-17 ENCOUNTER — NON-APPOINTMENT (OUTPATIENT)
Age: 75
End: 2025-08-17

## 2025-09-20 ENCOUNTER — NON-APPOINTMENT (OUTPATIENT)
Age: 75
End: 2025-09-20

## 2025-09-25 PROBLEM — L30.9 DERMATITIS: Status: ACTIVE | Noted: 2025-09-25

## 2025-09-25 PROBLEM — L82.1 SEBORRHEIC KERATOSIS: Status: ACTIVE | Noted: 2025-09-25

## 2025-09-25 PROBLEM — L21.9 SEBORRHEIC DERMATITIS: Status: ACTIVE | Noted: 2025-09-25

## (undated) DEVICE — STRYKER INTERPULSE HANDPIECE W IRR SUCTION TUBE

## (undated) DEVICE — SOL BETADINE POUCH 0.75OZ STERILE

## (undated) DEVICE — GLV 7.5 PROTEXIS (WHITE)

## (undated) DEVICE — SUT QUILL MONODERM 0 1/2 CIRCLE TAPR 45CM 26MM

## (undated) DEVICE — DRAPE TOWEL BLUE 17" X 24"

## (undated) DEVICE — MAKO BLADE STANDARD

## (undated) DEVICE — SPECIMEN CONTAINER 100ML

## (undated) DEVICE — SOL INJ NS 0.9% 500ML 1-PORT

## (undated) DEVICE — Device

## (undated) DEVICE — TOURNIQUET CUFF 30" SINGLE PORT W PLC

## (undated) DEVICE — TUBING TUR 2 PRONG

## (undated) DEVICE — POSITIONER CARDIAC BUMP

## (undated) DEVICE — DRAIN JACKSON PRATT 10FR ROUND END NO TROCAR

## (undated) DEVICE — DRSG PREVENA PEEL & PLACE KIT 20CM

## (undated) DEVICE — DRAIN BLAKE 15FR BARD CHANNEL

## (undated) DEVICE — SUT STRATAFIX SYMMETRIC PDS PLUS 1 18" CTX VIOLET

## (undated) DEVICE — DRSG VAC GRANUFOAM LARGE (BLACK)

## (undated) DEVICE — FOLEY TRAY 16FR 5CC LTX UMETER CLOSED

## (undated) DEVICE — POSITIONER FOAM EGG CRATE ULNAR 2PCS (PINK)

## (undated) DEVICE — MAKO CHECKPOINT KIT FEMORAL / TIBIAL

## (undated) DEVICE — SAW BLADE STRYKER RECIPROCATING 77.6X0.77X11.2MM

## (undated) DEVICE — SOL IRR POUR H2O 250ML

## (undated) DEVICE — DRAIN JACKSON PRATT 10MM FLAT FULL NO TROCAR

## (undated) DEVICE — SUT QUILL MONODERM 2-0 3/8 CIRCLE 45CM

## (undated) DEVICE — TOURNIQUET CUFF 34" DUAL PORT W PLC

## (undated) DEVICE — VENODYNE/SCD SLEEVE CALF LARGE

## (undated) DEVICE — WARMING BLANKET LOWER ADULT

## (undated) DEVICE — GLV 7 PROTEXIS (WHITE)

## (undated) DEVICE — BAG DECANTER IV STERILE

## (undated) DEVICE — SUT MONOSOF 2-0 18" C-15

## (undated) DEVICE — LAP PAD 18 X 18"

## (undated) DEVICE — DRAIN RESERVOIR FOR JACKSON PRATT 100CC CARDINAL

## (undated) DEVICE — DRAPE MAYO STAND 30"

## (undated) DEVICE — CANISTER KCI 500ML GEL SENSA TRAC

## (undated) DEVICE — DRSG STERISTRIPS 0.5 X 4"

## (undated) DEVICE — SOL IRR BAG NS 0.9% 3000ML

## (undated) DEVICE — DRAIN JACKSON PRATT 7MM FLAT FULL W 15 FR TROCAR

## (undated) DEVICE — BLADE SCALPEL SAFETYLOCK #10

## (undated) DEVICE — WOUND IRR SURGIPHOR

## (undated) DEVICE — MAKO VIZADISC KNEE TRACKING KIT

## (undated) DEVICE — GOWN TRIMAX LG

## (undated) DEVICE — PACK EXTREMITY

## (undated) DEVICE — MAKO DRAPE KIT

## (undated) DEVICE — BLADE SCALPEL SAFETYLOCK #15

## (undated) DEVICE — DRAIN JACKSON PRATT 3 SPRING RESERVOIR W 15FR PVC DRAIN

## (undated) DEVICE — DRSG WEBRIL 6"

## (undated) DEVICE — GLV 8.5 PROTEXIS (BLUE)

## (undated) DEVICE — GLV 8 PROTEXIS (WHITE)

## (undated) DEVICE — SAW BLADE STRYKER SAGITTAL DUAL CUT TEETH 35X64X.64MM

## (undated) DEVICE — SOL IRR POUR NS 0.9% 500ML

## (undated) DEVICE — SOL INJ NS 0.9% 1000ML

## (undated) DEVICE — DRSG VAC GRANUFOAM SMALL (BLACK)

## (undated) DEVICE — HOOD FLYTE STRYKER HELMET SHIELD

## (undated) DEVICE — DRAPE IOBAN 23" X 23"

## (undated) DEVICE — DRAPE SURGICAL #1010

## (undated) DEVICE — DRAPE SPLIT SHEET 77" X 108"

## (undated) DEVICE — DRSG AQUACEL 3.5 X 12"

## (undated) DEVICE — GLV 8.5 PROTEXIS (WHITE)

## (undated) DEVICE — MAKO BLADE NARROW

## (undated) DEVICE — SPLINT IMMOBILIZER 3-PANEL KNEE 20"

## (undated) DEVICE — SUT VICRYL 1 27" CPX UNDYED

## (undated) DEVICE — DRSG TAPE TRANSPORE 3"

## (undated) DEVICE — SUT PDO 2 1/2 CIRCLE 40MM NDL 45CM

## (undated) DEVICE — SAW BLADE STRYKER SAGITTAL 81.5X12.5X1.19MM

## (undated) DEVICE — MARKING PEN W RULER

## (undated) DEVICE — SAW BLADE STRYKER SAGITTAL DUAL CUT 64X35X.89MM

## (undated) DEVICE — MEDICATION LABELS W MARKER

## (undated) DEVICE — HOOD FLYTE STRYKER SURGICOOL W PEELAWAY

## (undated) DEVICE — SAW BLADE STRYKER SAGITTAL AGGRESSIVE 25X86.5X1.32MM

## (undated) DEVICE — WARMING BLANKET UPPER ADULT

## (undated) DEVICE — SUT MONOCRYL 2-0 27" SH UNDYED

## (undated) DEVICE — ELCTR BOVIE PENCIL SMOKE EVACUATION

## (undated) DEVICE — DRSG ACE BANDAGE 6"

## (undated) DEVICE — PACK MIS KNEE (1 PIECE)

## (undated) DEVICE — DRSG DERMABOND 0.7ML

## (undated) DEVICE — VISITEC 4X4

## (undated) DEVICE — STAPLER SKIN VISI-STAT 35 WIDE